# Patient Record
Sex: FEMALE | Race: WHITE | NOT HISPANIC OR LATINO | Employment: FULL TIME | URBAN - METROPOLITAN AREA
[De-identification: names, ages, dates, MRNs, and addresses within clinical notes are randomized per-mention and may not be internally consistent; named-entity substitution may affect disease eponyms.]

---

## 2023-12-12 ENCOUNTER — OFFICE VISIT (OUTPATIENT)
Dept: BARIATRICS | Facility: CLINIC | Age: 38
End: 2023-12-12
Payer: COMMERCIAL

## 2023-12-12 VITALS
RESPIRATION RATE: 14 BRPM | BODY MASS INDEX: 28.85 KG/M2 | WEIGHT: 213 LBS | HEIGHT: 72 IN | HEART RATE: 80 BPM | DIASTOLIC BLOOD PRESSURE: 70 MMHG | SYSTOLIC BLOOD PRESSURE: 124 MMHG

## 2023-12-12 DIAGNOSIS — E66.3 OVERWEIGHT: Primary | ICD-10-CM

## 2023-12-12 PROCEDURE — 99213 OFFICE O/P EST LOW 20 MIN: CPT | Performed by: NURSE PRACTITIONER

## 2023-12-12 RX ORDER — TOPIRAMATE 50 MG/1
50 TABLET, FILM COATED ORAL 2 TIMES DAILY
Qty: 180 TABLET | Refills: 1 | Status: SHIPPED | OUTPATIENT
Start: 2023-12-12

## 2023-12-12 NOTE — PROGRESS NOTES
Assessment/Plan:     Overweight  - Patient is pursuing Conservative Program  - Initial weight loss goal of 5-10% weight loss for improved health-met  - Patient denies personal history of pancreatitis. Patient also denies personal and family history of thyroid cancer and multiple endocrine neoplasia type 2 (MEN 2 tumor). Cornell Jacobo started Feb 2023 at weight of 237 lbs. Titrated up to 1.7 mg weekly, but then developed significant nausea and therefore medication stopped around the end of May 2023.  - Topamax started Aug 2023 to help with cravings at weight of 213 lbs. Increase Topamax from 25 mg twice a day to 50 mg twice a day. Tolerating well, intermittently helping with appetite. - Denies history of seizures, kidney stones, or glaucoma. - Potential side effects of Topamax (topiramate) include: numbness or tingling, fatigue, depression/anxiety, changes in taste, abdominal upset/heartburn, trouble sleeping, and may reduce sweating - stay well hydrated to avoid overheating. Continue to use birth control on Topamax, as it can cause birth defects. - In office urine pregnancy test was negative in Aug 2023. - Medication contract signed 8/8/2023.   - Contraception:  with vasectomy  - Screening labs done May 2023. Initial: 237 lbs BMI 33.05  Last visit: 213 lbs   Current: 213 lbs BMI 28.89  Change: - 24lbs (no change since last office visit  Goal: 200's lbs    Goals:  Continue food logging. 1400 calories per day. Keep up the great work with water intake, at least 64 oz daily. Keep up the great work with exercise. Try to consistently have midmorning snack to see if that helps with reducing hunger later in the day. Continue to use protein shakes and bars as needed. Increase Topamax         Peace Islas was seen today for follow-up. Diagnoses and all orders for this visit:    Overweight  -     topiramate (Topamax) 50 MG tablet;  Take 1 tablet (50 mg total) by mouth 2 (two) times a day            Follow up in approximately  2 month nurse visit and 4 months  with Non-Surgical Physician/Advanced Practitioner. Subjective:   Chief Complaint   Patient presents with    Follow-up     MWM- 4mth f/u- Waist 39in       Patient ID: Allie Degree  is a 45 y.o. female with excess weight/obesity here to pursue weight management. Patient is pursuing Conservative Program.   Most recent notes and records were reviewed. HPI    Wt Readings from Last 10 Encounters:   12/12/23 96.6 kg (213 lb)   10/10/23 94.6 kg (208 lb 9.6 oz)   08/08/23 96.6 kg (213 lb)   05/23/23 94 kg (207 lb 2 oz)   05/09/23 95.5 kg (210 lb 9.6 oz)   05/04/23 96.9 kg (213 lb 11.2 oz)   04/20/23 98.9 kg (218 lb)   04/10/23 100 kg (221 lb)   03/20/23 103 kg (227 lb 9.6 oz)   02/17/23 108 kg (237 lb)     Taking Topamax 25 mg twice a day. No negative side effects. Intermittently helping with appetite. Has been food logging. Getting 9665-8946 calories per day. 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   Mammogram: N/A      The following portions of the patient's history were reviewed and updated as appropriate: allergies, current medications, past family history, past medical history, past social history, past surgical history, and problem list.    Family History   Problem Relation Age of Onset    Hypertension Father     Hyperlipidemia Father         Pure hypercholesterolemia     Asthma Sister     Diabetes Maternal Aunt     Stroke Maternal Grandmother         Stroke syndrome     Other Family     Esophageal cancer Family     Heart disease Neg Hx     Thyroid disease Neg Hx         Review of Systems   HENT:  Negative for sore throat. Respiratory:  Negative for cough and shortness of breath. Cardiovascular:  Negative for chest pain and palpitations. Gastrointestinal:  Positive for constipation (intermittent) and diarrhea (intermittent). Negative for abdominal pain, nausea and vomiting. Denies GERD   Musculoskeletal:  Positive for back pain (chronic). Negative for arthralgias. Skin:  Negative for rash. Psychiatric/Behavioral:  Negative for suicidal ideas (or HI). Denies depression and anxiety       Objective:  /70 (BP Location: Left arm, Patient Position: Sitting)   Pulse 80   Resp 14   Ht 6' (1.829 m)   Wt 96.6 kg (213 lb)   BMI 28.89 kg/m²     Physical Exam  Vitals and nursing note reviewed. Constitutional   General appearance: Abnormal.  well developed and overweight. Eyes No conjunctival injection. Ears, Nose, Mouth, and Throat Oral mucosa moist.   Pulmonary   Respiratory effort: No increased work of breathing or signs of respiratory distress. Cardiovascular   Examination of extremities for edema and/or varicosities: Normal.  no edema. Abdomen   Abdomen: Abnormal overweight.      Musculoskeletal   Normal range of motion  Neurological   Gait and station: Normal.   Psychiatric   Orientation to person, place and time: Normal.    Affect: appropriate

## 2023-12-12 NOTE — ASSESSMENT & PLAN NOTE
- Patient is pursuing Conservative Program  - Initial weight loss goal of 5-10% weight loss for improved health-met  - Patient denies personal history of pancreatitis. Patient also denies personal and family history of thyroid cancer and multiple endocrine neoplasia type 2 (MEN 2 tumor). Marni Durham started Feb 2023 at weight of 237 lbs. Titrated up to 1.7 mg weekly, but then developed significant nausea and therefore medication stopped around the end of May 2023.  - Topamax started Aug 2023 to help with cravings at weight of 213 lbs. Increase Topamax from 25 mg twice a day to 50 mg twice a day. Tolerating well, intermittently helping with appetite. - Denies history of seizures, kidney stones, or glaucoma. - Potential side effects of Topamax (topiramate) include: numbness or tingling, fatigue, depression/anxiety, changes in taste, abdominal upset/heartburn, trouble sleeping, and may reduce sweating - stay well hydrated to avoid overheating. Continue to use birth control on Topamax, as it can cause birth defects. - In office urine pregnancy test was negative in Aug 2023. - Medication contract signed 8/8/2023.   - Contraception:  with vasectomy  - Screening labs done May 2023. Initial: 237 lbs BMI 33.05  Last visit: 213 lbs   Current: 213 lbs BMI 28.89  Change: - 24lbs (no change since last office visit  Goal: 200's lbs    Goals:  Continue food logging. 1400 calories per day. Keep up the great work with water intake, at least 64 oz daily. Keep up the great work with exercise. Try to consistently have midmorning snack to see if that helps with reducing hunger later in the day. Continue to use protein shakes and bars as needed.   Increase Topamax

## 2024-02-15 ENCOUNTER — CLINICAL SUPPORT (OUTPATIENT)
Dept: BARIATRICS | Facility: CLINIC | Age: 39
End: 2024-02-15

## 2024-02-15 VITALS
DIASTOLIC BLOOD PRESSURE: 75 MMHG | BODY MASS INDEX: 29.01 KG/M2 | TEMPERATURE: 98.2 F | WEIGHT: 214.2 LBS | HEART RATE: 76 BPM | RESPIRATION RATE: 16 BRPM | SYSTOLIC BLOOD PRESSURE: 115 MMHG | HEIGHT: 72 IN

## 2024-02-15 DIAGNOSIS — R63.5 ABNORMAL WEIGHT GAIN: Primary | ICD-10-CM

## 2024-02-15 PROCEDURE — RECHECK

## 2024-02-15 NOTE — PROGRESS NOTES
Patient last visit weight:  Patient current visit weight:    If you are taking phentermine or other oral weight loss medications, are you experiencing any of the following symptoms:  Headache:  No  Blurred Vision: No  Chest Pain: No  Palpitations: No  Insomnia: No  SPECIFY ORAL MEDICATION AND DOSAGE:  Topamax  She stated she went back down to 50mg again on the 75mg she had some eye twitching. Stop since she went back to 50mg    If you are taking an injectable medication,  are you experiencing any of the following symptoms:  Bloating:   Nausea:  Vomiting:   Constipation:   Diarrhea:  SPECIFY INJECTABLE MEDICATION AND CURRENT DOSAGE:      Vitals:    Is BP less than 100/60? No  Is BP greater than 140/90? No  Is HR greater than 100? No  **If yes to any of the above, have patient relax and repeat in 5-10 minutes**    Repeat values:    Is BP less than 100/60?  Is BP greater than 140/90?  Is HR greater than 100?  **If values remain outside of ranges above, please consult provider for next steps**

## 2024-03-02 DIAGNOSIS — F39 MOOD DISORDER (HCC): ICD-10-CM

## 2024-05-09 ENCOUNTER — OFFICE VISIT (OUTPATIENT)
Dept: FAMILY MEDICINE CLINIC | Facility: CLINIC | Age: 39
End: 2024-05-09
Payer: COMMERCIAL

## 2024-05-09 VITALS
HEIGHT: 72 IN | DIASTOLIC BLOOD PRESSURE: 70 MMHG | HEART RATE: 76 BPM | BODY MASS INDEX: 28.88 KG/M2 | SYSTOLIC BLOOD PRESSURE: 120 MMHG | TEMPERATURE: 96.3 F | RESPIRATION RATE: 18 BRPM | WEIGHT: 213.2 LBS

## 2024-05-09 DIAGNOSIS — F39 MOOD DISORDER (HCC): ICD-10-CM

## 2024-05-09 DIAGNOSIS — Z00.00 ROUTINE PHYSICAL EXAMINATION: Primary | ICD-10-CM

## 2024-05-09 DIAGNOSIS — E66.3 OVERWEIGHT: ICD-10-CM

## 2024-05-09 PROCEDURE — 99395 PREV VISIT EST AGE 18-39: CPT | Performed by: NURSE PRACTITIONER

## 2024-05-09 NOTE — PROGRESS NOTES
ADULT ANNUAL PHYSICAL  [facility]    NAME: Cristel Daly  AGE: 38 y.o. SEX: female  : 1985     DATE: 2024     Assessment and Plan:     Problem List Items Addressed This Visit          Behavioral Health    Mood disorder (HCC) of depressive type in remission     Stable, continue Zoloft 50 mg daily             Other    Overweight     Pt is following with Weight Management          Relevant Orders    CBC and differential    Comprehensive metabolic panel    Hemoglobin A1C    TSH, 3rd generation with Free T4 reflex    Lipid panel     Other Visit Diagnoses       Routine physical examination    -  Primary            Immunizations and preventive care screenings were discussed with patient today. Appropriate education was printed on patient's after visit summary.    Counseling:  Alcohol/drug use: discussed moderation in alcohol intake, the recommendations for healthy alcohol use, and avoidance of illicit drug use.  Dental Health: discussed importance of regular tooth brushing, flossing, and dental visits.  Injury prevention: discussed safety/seat belts, safety helmets, smoke detectors, carbon dioxide detectors, and smoking near bedding or upholstery.  Sexual health: discussed sexually transmitted diseases, partner selection, use of condoms, avoidance of unintended pregnancy, and contraceptive alternatives.  Exercise: the importance of regular exercise/physical activity was discussed. Recommend exercise 3-5 times per week for at least 30 minutes.       Depression Screening and Follow-up Plan: Patient was screened for depression during today's encounter. They screened negative with a PHQ-2 score of 0.        No follow-ups on file.     Chief Complaint:     Chief Complaint   Patient presents with    Physical Exam      History of Present Illness:     Adult Annual Physical   Patient here for a comprehensive physical exam. The patient reports no problems.   Doing well overall     Follows with Weight Management, currently on Topamax but hasn't seem much improvement with this and is considering stopping it    Colonoscopy completed 3/31/22, repeat in 7 years per GI note on 3/20/23, Dr. Finn  Follows with GI for constipation, doing well on Linzess     Diet and Physical Activity  Diet/Nutrition: well balanced diet.   Exercise: walking. Enjoys walking her dog regularly     Depression Screening  PHQ-2/9 Depression Screening    Little interest or pleasure in doing things: 0 - not at all  Feeling down, depressed, or hopeless: 0 - not at all  PHQ-2 Score: 0  PHQ-2 Interpretation: Negative depression screen       General Health  Sleep: sleeps well.   Hearing: normal - bilateral.  Vision: no vision problems.   Dental: regular dental visits.       /GYN Health  Follows with gynecology? yes   Last menstrual period: 4/25/24  Contraceptive method: male partner had vasectomy.  History of STDs?: no.     Advanced Care Planning  Do you have an advanced directive? no  Do you have a durable medical power of ? no  ACP document given to the patient? No, pt declined       Review of Systems:     Review of Systems   Constitutional:  Negative for activity change, appetite change, chills, diaphoresis, fatigue, fever and unexpected weight change.   Eyes:  Negative for visual disturbance.   Respiratory:  Negative for cough, chest tightness, shortness of breath and wheezing.    Cardiovascular:  Negative for chest pain, palpitations and leg swelling.   Gastrointestinal:  Negative for abdominal pain, blood in stool, constipation, diarrhea and nausea.   Genitourinary:  Negative for dysuria.   Musculoskeletal:  Negative for arthralgias and myalgias.   Skin:  Negative for rash and wound.   Neurological:  Negative for dizziness, weakness, numbness and headaches.   Hematological:  Negative for adenopathy. Does not bruise/bleed easily.   Psychiatric/Behavioral:  Negative for dysphoric mood,  self-injury, sleep disturbance and suicidal ideas. The patient is not nervous/anxious.       Past Medical History:     Past Medical History:   Diagnosis Date    Chronic left lumbar radiculopathy     Resolved 7/14/2016     Chronic pain disorder     Depression     Depression     Hypertension 07/09/2012    Pneumonia     Varicella       Past Surgical History:     Past Surgical History:   Procedure Laterality Date    COLONOSCOPY      RHIZOTOMY      Last assessed 4/26/2017       Social History:     Social History     Socioeconomic History    Marital status: /Civil Union     Spouse name: None    Number of children: None    Years of education: None    Highest education level: None   Occupational History    Occupation: MA   Tobacco Use    Smoking status: Never    Smokeless tobacco: Never   Vaping Use    Vaping status: Never Used   Substance and Sexual Activity    Alcohol use: Yes     Comment: occaisionally    Drug use: No    Sexual activity: Yes     Partners: Male     Birth control/protection: Male Sterilization   Other Topics Concern    None   Social History Narrative    None     Social Determinants of Health     Financial Resource Strain: Not on file   Food Insecurity: Not on file   Transportation Needs: Not on file   Physical Activity: Not on file   Stress: Not on file   Social Connections: Not on file   Intimate Partner Violence: Not on file   Housing Stability: Not on file      Family History:     Family History   Problem Relation Age of Onset    Hypertension Father     Hyperlipidemia Father         Pure hypercholesterolemia     Asthma Sister     Diabetes Maternal Aunt     Stroke Maternal Grandmother         Stroke syndrome     Other Family     Esophageal cancer Family     Heart disease Neg Hx     Thyroid disease Neg Hx       Current Medications:     Current Outpatient Medications   Medication Sig Dispense Refill    acetaminophen (TYLENOL) 500 mg tablet Take 1,000 mg by mouth as needed for mild pain (TID if  "needed)      BIOTIN PO Take by mouth      ibuprofen (MOTRIN) 800 mg tablet Take 800 mg by mouth as needed for mild pain      linaCLOtide (Linzess) 290 MCG CAPS Take 1 capsule by mouth daily before breakfast 90 capsule 2    Loratadine 10 MG CAPS       Multiple Vitamin (multivitamin) tablet Take 1 tablet by mouth daily With iron      sertraline (ZOLOFT) 50 mg tablet TAKE ONE TABLET BY MOUTH EVERY DAY 90 tablet 0    topiramate (Topamax) 50 MG tablet Take 1 tablet (50 mg total) by mouth 2 (two) times a day 180 tablet 1     No current facility-administered medications for this visit.      Allergies:     No Known Allergies   Physical Exam:     /70 (BP Location: Left arm, Patient Position: Sitting, Cuff Size: Standard)   Pulse 76   Temp (!) 96.3 °F (35.7 °C) (Temporal)   Resp 18   Ht 6' 1\" (1.854 m)   Wt 96.7 kg (213 lb 3.2 oz)   LMP  (LMP Unknown)   BMI 28.13 kg/m²     Physical Exam  Constitutional:       General: She is not in acute distress.     Appearance: Normal appearance. She is not ill-appearing, toxic-appearing or diaphoretic.   HENT:      Head: Normocephalic and atraumatic.      Mouth/Throat:      Mouth: Mucous membranes are moist.   Eyes:      Extraocular Movements: Extraocular movements intact.      Conjunctiva/sclera: Conjunctivae normal.      Pupils: Pupils are equal, round, and reactive to light.   Cardiovascular:      Rate and Rhythm: Normal rate and regular rhythm.      Pulses: Normal pulses.      Heart sounds: Normal heart sounds. No murmur heard.  Pulmonary:      Effort: Pulmonary effort is normal. No respiratory distress.      Breath sounds: Normal breath sounds. No wheezing.   Abdominal:      General: Bowel sounds are normal. There is no distension.      Palpations: Abdomen is soft.      Tenderness: There is no abdominal tenderness.   Musculoskeletal:         General: Normal range of motion.      Cervical back: Normal range of motion and neck supple. No rigidity or tenderness. No muscular " tenderness.   Lymphadenopathy:      Cervical: No cervical adenopathy.   Skin:     General: Skin is warm and dry.      Capillary Refill: Capillary refill takes less than 2 seconds.      Findings: No bruising, erythema or rash.   Neurological:      General: No focal deficit present.      Mental Status: She is alert and oriented to person, place, and time.   Psychiatric:         Mood and Affect: Mood normal.         Behavior: Behavior normal.         Thought Content: Thought content normal.         Judgment: Judgment normal.          RENEE Flores   [facility]

## 2024-06-11 ENCOUNTER — OFFICE VISIT (OUTPATIENT)
Dept: BARIATRICS | Facility: CLINIC | Age: 39
End: 2024-06-11
Payer: COMMERCIAL

## 2024-06-11 VITALS
BODY MASS INDEX: 28.96 KG/M2 | HEIGHT: 72 IN | WEIGHT: 213.8 LBS | HEART RATE: 58 BPM | TEMPERATURE: 97.8 F | SYSTOLIC BLOOD PRESSURE: 142 MMHG | RESPIRATION RATE: 16 BRPM | DIASTOLIC BLOOD PRESSURE: 70 MMHG

## 2024-06-11 DIAGNOSIS — E66.3 OVERWEIGHT: Primary | ICD-10-CM

## 2024-06-11 DIAGNOSIS — R63.8 ABNORMAL CRAVING: ICD-10-CM

## 2024-06-11 PROBLEM — R11.0 NAUSEA: Status: RESOLVED | Noted: 2023-05-23 | Resolved: 2024-06-11

## 2024-06-11 PROCEDURE — 99214 OFFICE O/P EST MOD 30 MIN: CPT | Performed by: NURSE PRACTITIONER

## 2024-06-11 RX ORDER — BUPROPION HYDROCHLORIDE 150 MG/1
150 TABLET ORAL DAILY
Qty: 30 TABLET | Refills: 3 | Status: SHIPPED | OUTPATIENT
Start: 2024-06-11

## 2024-06-11 RX ORDER — NALTREXONE HYDROCHLORIDE 50 MG/1
TABLET, FILM COATED ORAL
Qty: 30 TABLET | Refills: 3 | Status: SHIPPED | OUTPATIENT
Start: 2024-06-11

## 2024-06-11 NOTE — PROGRESS NOTES
Assessment/Plan:     Overweight  - Patient is pursuing Conservative Program  - Initial weight loss goal of 5-10% weight loss for improved health-met  - Patient denies personal history of pancreatitis. Patient also denies personal and family history of thyroid cancer and multiple endocrine neoplasia type 2 (MEN 2 tumor).  - Wegovy started Feb 2023 at weight of 237 lbs. Titrated up to 1.7 mg weekly, but then developed significant nausea and therefore medication stopped around the end of May 2023.  - Topamax started Aug 2023 to help with cravings at weight of 213 lbs. In office urine pregnancy test negative. Topamax titrated up to 50 mg twice a day, but had eye twitching and therefore reduced to 50 mg every evening. Currently taking 50 mg every evening, tolerating well, but not helping with appetite/cravings. Will wean off of Topamax - 25 mg every evening for one week, then 25 mg every other evening for one week, then stop.     - Denies history of seizures, kidney stones, or glaucoma.   - Interested in starting another medication to help with appetite/cravings.   - She made an informed decision to start Wellbutrin and Nalrexone off label to mimic Contrave.  - /70, not typically elevated, she relates this to caffeine and meeting this morning. BP one month ago at primary care visit was 120/70. Will check repeat BP and HR in office one week after starting Wellbutrin to ensure stability. She will call to schedule that.   - Start Wellbutrin  mg daily in the morning and in 1 week, as long as you are tolerating Wellbutrin well, start Naltrexone.   - Start Naltrexone 50 mg tablet, take 1/2 tablet by mouth daily in the morning for 2 weeks, then take 1/2 tablet twice a day.   - Potential side effects of Wellbutrin include: abdominal upset, headache, dizziness, trouble sleeping, increased blood pressure, depression/anxiety, and fatigue. Patient should call/return if he/she develops symptoms of depression/aniety.  Patient should have ER evaluation if thoughts of harming self/others occur. Wellbutrin should be weaned rather than stopped abruptly.    - Side effects of naltrexone: nausea, vomiting, diarrhea, constipation, headache, anxiety, and confusion. You may feel nauseated when consuming alcohol while taking naltrexone. Must be discontinued prior to surgery. Please notify me if you are scheduled for surgery, so that naltrexone can be stopped prior to surgery.      - Medication contract signed 8/8/2023.   - Contraception:  with vasectomy  - Getting routine screening labs through primary care.        Initial: 237 lbs BMI 33.05  Last visit: 213 lbs BMI 28.89  Current: 213.8 lbs BMI 29.40  Change: - 23 lbs (+1 since last office visit  Goal: 200's lbs    Goals:  Continue food logging.    1400 calories per day.  Keep up the great work with water intake, at least 64 oz daily.  Keep up the great work with exercise.   Continue to use protein shakes and bars as needed.  Wean off Topamax  Start Wellbutrin and Naltrexone.  Check BP one week after starting Wellbutrin.           Molly was seen today for follow-up.    Diagnoses and all orders for this visit:    Overweight  -     buPROPion (Wellbutrin XL) 150 mg 24 hr tablet; Take 1 tablet (150 mg total) by mouth daily  -     naltrexone (REVIA) 50 mg tablet; Take 1/2 tablet by mouth daily in the morning for 2 weeks, then take 1/2 tablet twice a day.    Abnormal craving  -     buPROPion (Wellbutrin XL) 150 mg 24 hr tablet; Take 1 tablet (150 mg total) by mouth daily  -     naltrexone (REVIA) 50 mg tablet; Take 1/2 tablet by mouth daily in the morning for 2 weeks, then take 1/2 tablet twice a day.              Follow up in approximately  1 week nurse visit, 3 month nurse visit and 6 months  with Non-Surgical Physician/Advanced Practitioner.    Subjective:   Chief Complaint   Patient presents with    Follow-up     MWM- 4mo f/u; Waist 38.5in       Patient ID: Cristel Daly  is a 38  y.o. female with excess weight/obesity here to pursue weight management.  Patient is pursuing Conservative Program.   Most recent notes and records were reviewed.    HPI    Wt Readings from Last 10 Encounters:   06/11/24 97 kg (213 lb 12.8 oz)   05/09/24 96.7 kg (213 lb 3.2 oz)   02/15/24 97.2 kg (214 lb 3.2 oz)   12/12/23 96.6 kg (213 lb)   10/10/23 94.6 kg (208 lb 9.6 oz)   08/08/23 96.6 kg (213 lb)   05/23/23 94 kg (207 lb 2 oz)   05/09/23 95.5 kg (210 lb 9.6 oz)   05/04/23 96.9 kg (213 lb 11.2 oz)   04/20/23 98.9 kg (218 lb)     Taking Topamax 50 mg every evening, no negative side effects from current dose. Had eye twitching on higher doses. Not helpful with appetite.       Has been food logging. Getting 2768-4899 calories per day. Struggling with cravings in the evening.        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  Mammogram: N/A      The following portions of the patient's history were reviewed and updated as appropriate: allergies, current medications, past family history, past medical history, past social history, past surgical history, and problem list.    Family History   Problem Relation Age of Onset    Hypertension Father     Hyperlipidemia Father         Pure hypercholesterolemia     Asthma Sister     Diabetes Maternal Aunt     Stroke Maternal Grandmother         Stroke syndrome     Other Family     Esophageal cancer Family     Heart disease Neg Hx     Thyroid disease Neg Hx         Review of Systems   HENT:  Negative for sore throat.    Respiratory:  Negative for cough and shortness of breath.    Cardiovascular:  Negative for chest pain and palpitations.   Gastrointestinal:   "Positive for constipation (intermittent) and diarrhea (intermittent). Negative for abdominal pain, nausea and vomiting.        Denies GERD   Musculoskeletal:  Positive for back pain (chronic). Negative for arthralgias.   Skin:  Negative for rash.   Psychiatric/Behavioral:  Negative for suicidal ideas (or HI).         Denies depression and anxiety       Objective:  /70   Pulse 58   Temp 97.8 °F (36.6 °C)   Resp 16   Ht 5' 11.5\" (1.816 m)   Wt 97 kg (213 lb 12.8 oz)   BMI 29.40 kg/m²     Physical Exam  Vitals and nursing note reviewed.        Constitutional   General appearance: Abnormal.  well developed and overweight.   Eyes No conjunctival injection.   Ears, Nose, Mouth, and Throat Oral mucosa moist.   Pulmonary   Respiratory effort: No increased work of breathing or signs of respiratory distress.    Cardiovascular   Examination of extremities for edema and/or varicosities: Normal.  no edema.   Abdomen   Abdomen: Abnormal overweight.     Musculoskeletal   Normal range of motion  Neurological   Gait and station: Normal.   Psychiatric   Orientation to person, place and time: Normal.    Affect: appropriate        "

## 2024-06-11 NOTE — ASSESSMENT & PLAN NOTE
- Patient is pursuing Conservative Program  - Initial weight loss goal of 5-10% weight loss for improved health-met  - Patient denies personal history of pancreatitis. Patient also denies personal and family history of thyroid cancer and multiple endocrine neoplasia type 2 (MEN 2 tumor).  - Wegovy started Feb 2023 at weight of 237 lbs. Titrated up to 1.7 mg weekly, but then developed significant nausea and therefore medication stopped around the end of May 2023.  - Topamax started Aug 2023 to help with cravings at weight of 213 lbs. In office urine pregnancy test negative. Topamax titrated up to 50 mg twice a day, but had eye twitching and therefore reduced to 50 mg every evening. Currently taking 50 mg every evening, tolerating well, but not helping with appetite/cravings. Will wean off of Topamax - 25 mg every evening for one week, then 25 mg every other evening for one week, then stop.     - Denies history of seizures, kidney stones, or glaucoma.   - Interested in starting another medication to help with appetite/cravings.   - She made an informed decision to start Wellbutrin and Nalrexone off label to mimic Contrave.  - /70, not typically elevated, she relates this to caffeine and meeting this morning. BP one month ago at primary care visit was 120/70. Will check repeat BP and HR in office one week after starting Wellbutrin to ensure stability. She will call to schedule that.   - Start Wellbutrin  mg daily in the morning and in 1 week, as long as you are tolerating Wellbutrin well, start Naltrexone.   - Start Naltrexone 50 mg tablet, take 1/2 tablet by mouth daily in the morning for 2 weeks, then take 1/2 tablet twice a day.   - Potential side effects of Wellbutrin include: abdominal upset, headache, dizziness, trouble sleeping, increased blood pressure, depression/anxiety, and fatigue. Patient should call/return if he/she develops symptoms of depression/aniety. Patient should have ER evaluation if  thoughts of harming self/others occur. Wellbutrin should be weaned rather than stopped abruptly.    - Side effects of naltrexone: nausea, vomiting, diarrhea, constipation, headache, anxiety, and confusion. You may feel nauseated when consuming alcohol while taking naltrexone. Must be discontinued prior to surgery. Please notify me if you are scheduled for surgery, so that naltrexone can be stopped prior to surgery.      - Medication contract signed 8/8/2023.   - Contraception:  with vasectomy  - Getting routine screening labs through primary care.        Initial: 237 lbs BMI 33.05  Last visit: 213 lbs BMI 28.89  Current: 213.8 lbs BMI 29.40  Change: - 23 lbs (+1 since last office visit  Goal: 200's lbs    Goals:  Continue food logging.    1400 calories per day.  Keep up the great work with water intake, at least 64 oz daily.  Keep up the great work with exercise.   Continue to use protein shakes and bars as needed.  Wean off Topamax  Start Wellbutrin and Naltrexone.  Check BP one week after starting Wellbutrin.

## 2024-06-11 NOTE — PATIENT INSTRUCTIONS
Take Topamax 1/2 tab daily in the evening for one week, then 1/2 tab every other evening for one week, then stop.     Start Wellbutrin  mg daily in the morning and in 1 week, as long as you are tolerating Wellbutrin well, start Naltrexone.     Start Naltrexone 50 mg tablet, take 1/2 tablet by mouth daily in the morning for 2 weeks, then take 1/2 tablet twice a day.     Potential side effects of Wellbutrin include: abdominal upset, headache, dizziness, trouble sleeping, increased blood pressure, depression/anxiety, and fatigue. Patient should call/return if he/she develops symptoms of depression/aniety. Patient should have ER evaluation if thoughts of harming self/others occur. Wellbutrin should be weaned rather than stopped abruptly.      Side effects of naltrexone: nausea, vomiting, diarrhea, constipation, headache, anxiety, and confusion. You may feel nauseated when consuming alcohol while taking naltrexone. Must be discontinued prior to surgery. Please notify me if you are scheduled for surgery, so that naltrexone can be stopped prior to surgery.

## 2024-06-16 DIAGNOSIS — F39 MOOD DISORDER (HCC): ICD-10-CM

## 2024-07-19 ENCOUNTER — CLINICAL SUPPORT (OUTPATIENT)
Dept: BARIATRICS | Facility: CLINIC | Age: 39
End: 2024-07-19

## 2024-07-19 VITALS
SYSTOLIC BLOOD PRESSURE: 124 MMHG | HEIGHT: 72 IN | WEIGHT: 216 LBS | TEMPERATURE: 97.3 F | DIASTOLIC BLOOD PRESSURE: 78 MMHG | RESPIRATION RATE: 16 BRPM | BODY MASS INDEX: 29.26 KG/M2 | HEART RATE: 62 BPM

## 2024-07-19 DIAGNOSIS — R63.5 ABNORMAL WEIGHT GAIN: Primary | ICD-10-CM

## 2024-07-19 PROCEDURE — RECHECK

## 2024-07-19 NOTE — PROGRESS NOTES
Patient last visit weight: 213lb  Patient current visit weight: 216lb     If you are taking phentermine or other oral weight loss medications, are you experiencing any of the following symptoms:  Headache: NO   Blurred Vision: NO   Chest Pain: NO   Palpitations: NO   Insomnia: NO   SPECIFY ORAL MEDICATION AND DOSAGE: Wellbutrin XL and Naltrexone. Patient tolerating well.     If you are taking an injectable medication,  are you experiencing any of the following symptoms:  Bloating:   Nausea:  Vomiting:   Constipation:   Diarrhea:  SPECIFY INJECTABLE MEDICATION AND CURRENT DOSAGE:       Vitals:    Is BP less than 100/60? NO   Is BP greater than 140/90? NO   Is HR greater than 100? NO   **If yes to any of the above, have patient relax and repeat in 5-10 minutes**    Repeat values:    Is BP less than 100/60?  Is BP greater than 140/90?  Is HR greater than 100?  **If values remain outside of ranges above, please consult provider for next steps**

## 2024-07-27 ENCOUNTER — APPOINTMENT (OUTPATIENT)
Dept: LAB | Facility: IMAGING CENTER | Age: 39
End: 2024-07-27
Payer: COMMERCIAL

## 2024-07-27 DIAGNOSIS — Z00.8 HEALTH EXAMINATION IN POPULATION SURVEY: ICD-10-CM

## 2024-07-27 DIAGNOSIS — E66.3 OVERWEIGHT: ICD-10-CM

## 2024-07-27 LAB
ALBUMIN SERPL BCG-MCNC: 4.1 G/DL (ref 3.5–5)
ALP SERPL-CCNC: 35 U/L (ref 34–104)
ALT SERPL W P-5'-P-CCNC: 12 U/L (ref 7–52)
ANION GAP SERPL CALCULATED.3IONS-SCNC: 8 MMOL/L (ref 4–13)
AST SERPL W P-5'-P-CCNC: 15 U/L (ref 13–39)
BASOPHILS # BLD AUTO: 0.06 THOUSANDS/ÂΜL (ref 0–0.1)
BASOPHILS NFR BLD AUTO: 1 % (ref 0–1)
BILIRUB SERPL-MCNC: 0.45 MG/DL (ref 0.2–1)
BUN SERPL-MCNC: 14 MG/DL (ref 5–25)
CALCIUM SERPL-MCNC: 9.2 MG/DL (ref 8.4–10.2)
CHLORIDE SERPL-SCNC: 106 MMOL/L (ref 96–108)
CHOLEST SERPL-MCNC: 164 MG/DL
CO2 SERPL-SCNC: 24 MMOL/L (ref 21–32)
CREAT SERPL-MCNC: 0.95 MG/DL (ref 0.6–1.3)
EOSINOPHIL # BLD AUTO: 0.16 THOUSAND/ÂΜL (ref 0–0.61)
EOSINOPHIL NFR BLD AUTO: 3 % (ref 0–6)
ERYTHROCYTE [DISTWIDTH] IN BLOOD BY AUTOMATED COUNT: 13.2 % (ref 11.6–15.1)
EST. AVERAGE GLUCOSE BLD GHB EST-MCNC: 100 MG/DL
GFR SERPL CREATININE-BSD FRML MDRD: 76 ML/MIN/1.73SQ M
GLUCOSE P FAST SERPL-MCNC: 87 MG/DL (ref 65–99)
HBA1C MFR BLD: 5.1 %
HCT VFR BLD AUTO: 42 % (ref 34.8–46.1)
HDLC SERPL-MCNC: 67 MG/DL
HGB BLD-MCNC: 13.8 G/DL (ref 11.5–15.4)
IMM GRANULOCYTES # BLD AUTO: 0.01 THOUSAND/UL (ref 0–0.2)
IMM GRANULOCYTES NFR BLD AUTO: 0 % (ref 0–2)
LDLC SERPL CALC-MCNC: 81 MG/DL (ref 0–100)
LYMPHOCYTES # BLD AUTO: 1.98 THOUSANDS/ÂΜL (ref 0.6–4.47)
LYMPHOCYTES NFR BLD AUTO: 34 % (ref 14–44)
MCH RBC QN AUTO: 31.7 PG (ref 26.8–34.3)
MCHC RBC AUTO-ENTMCNC: 32.9 G/DL (ref 31.4–37.4)
MCV RBC AUTO: 97 FL (ref 82–98)
MONOCYTES # BLD AUTO: 0.44 THOUSAND/ÂΜL (ref 0.17–1.22)
MONOCYTES NFR BLD AUTO: 8 % (ref 4–12)
NEUTROPHILS # BLD AUTO: 3.13 THOUSANDS/ÂΜL (ref 1.85–7.62)
NEUTS SEG NFR BLD AUTO: 54 % (ref 43–75)
NONHDLC SERPL-MCNC: 97 MG/DL
NRBC BLD AUTO-RTO: 0 /100 WBCS
PLATELET # BLD AUTO: 205 THOUSANDS/UL (ref 149–390)
PMV BLD AUTO: 12.1 FL (ref 8.9–12.7)
POTASSIUM SERPL-SCNC: 4.2 MMOL/L (ref 3.5–5.3)
PROT SERPL-MCNC: 7 G/DL (ref 6.4–8.4)
RBC # BLD AUTO: 4.35 MILLION/UL (ref 3.81–5.12)
SODIUM SERPL-SCNC: 138 MMOL/L (ref 135–147)
TRIGL SERPL-MCNC: 80 MG/DL
TSH SERPL DL<=0.05 MIU/L-ACNC: 2.02 UIU/ML (ref 0.45–4.5)
WBC # BLD AUTO: 5.78 THOUSAND/UL (ref 4.31–10.16)

## 2024-07-27 PROCEDURE — 36415 COLL VENOUS BLD VENIPUNCTURE: CPT

## 2024-07-27 PROCEDURE — 80061 LIPID PANEL: CPT

## 2024-07-27 PROCEDURE — 80053 COMPREHEN METABOLIC PANEL: CPT

## 2024-07-27 PROCEDURE — 85025 COMPLETE CBC W/AUTO DIFF WBC: CPT

## 2024-07-27 PROCEDURE — 83036 HEMOGLOBIN GLYCOSYLATED A1C: CPT

## 2024-07-27 PROCEDURE — 84443 ASSAY THYROID STIM HORMONE: CPT

## 2024-07-29 DIAGNOSIS — Z00.6 ENCOUNTER FOR EXAMINATION FOR NORMAL COMPARISON OR CONTROL IN CLINICAL RESEARCH PROGRAM: ICD-10-CM

## 2024-08-13 ENCOUNTER — ANNUAL EXAM (OUTPATIENT)
Dept: OBGYN CLINIC | Facility: MEDICAL CENTER | Age: 39
End: 2024-08-13
Payer: COMMERCIAL

## 2024-08-13 VITALS
DIASTOLIC BLOOD PRESSURE: 60 MMHG | SYSTOLIC BLOOD PRESSURE: 118 MMHG | HEIGHT: 71 IN | BODY MASS INDEX: 29.96 KG/M2 | WEIGHT: 214 LBS

## 2024-08-13 DIAGNOSIS — Z12.4 SCREENING FOR CERVICAL CANCER: ICD-10-CM

## 2024-08-13 DIAGNOSIS — Z01.419 WOMEN'S ANNUAL ROUTINE GYNECOLOGICAL EXAMINATION: Primary | ICD-10-CM

## 2024-08-13 PROCEDURE — G0145 SCR C/V CYTO,THINLAYER,RESCR: HCPCS | Performed by: OBSTETRICS & GYNECOLOGY

## 2024-08-13 PROCEDURE — S0612 ANNUAL GYNECOLOGICAL EXAMINA: HCPCS | Performed by: OBSTETRICS & GYNECOLOGY

## 2024-08-13 PROCEDURE — G0476 HPV COMBO ASSAY CA SCREEN: HCPCS | Performed by: OBSTETRICS & GYNECOLOGY

## 2024-08-13 NOTE — PROGRESS NOTES
A/P    1.  Annual exam    Last PAP  4/19/22- neg neg    2021- HPV positive    We did discuss that negative last year can push to 3-5 years but with the positive in 2021- consider now and if negative again then spacing out    Next due  do today    Scheduling of pap discussed in detail            38 y.o.,No LMP recorded.  C/O no gyn concerns doing well       Past medical / social / surgical / family history reviewed and updated   Medication and allergies discussed in detail and updated     Review of Systems - History obtained from chart review and the patient  General ROS: negative  Psychological ROS: negative  Ophthalmic ROS: negative  ENT ROS: negative  Allergy and Immunology ROS: negative  Hematological and Lymphatic ROS: negative  Endocrine ROS: negative  Breast ROS: negative for breast lumps  Respiratory ROS: no cough, shortness of breath, or wheezing  Cardiovascular ROS: no chest pain or dyspnea on exertion  Gastrointestinal ROS: no abdominal pain, change in bowel habits, or black or bloody stools  Genito-Urinary ROS: no dysuria, trouble voiding, or hematuria  Musculoskeletal ROS: negative  Neurological ROS: no TIA or stroke symptoms  Dermatological ROS: negative        Physical Exam  Vitals reviewed. Exam conducted with a chaperone present.   Constitutional:       Appearance: She is well-developed.   Neck:      Thyroid: No thyromegaly.   Cardiovascular:      Rate and Rhythm: Normal rate.      Heart sounds: Normal heart sounds.   Pulmonary:      Effort: Pulmonary effort is normal. No accessory muscle usage or respiratory distress.      Breath sounds: Normal air entry.   Chest:      Chest wall: No tenderness.   Breasts:     Breasts are symmetrical.      Right: No inverted nipple, mass or tenderness.      Left: No inverted nipple, mass or tenderness.   Abdominal:      General: There is no distension.      Palpations: Abdomen is soft.      Tenderness: There is no abdominal tenderness. There is no right CVA  tenderness, left CVA tenderness, guarding or rebound.   Genitourinary:     General: Normal vulva.      Exam position: Lithotomy position.      Labia:         Right: No rash, tenderness, lesion or injury.         Left: No rash, tenderness, lesion or injury.       Vagina: Normal. No foreign body. No vaginal discharge or bleeding.      Cervix: Normal.      Uterus: Normal. Not enlarged and not fixed.       Adnexa: Right adnexa normal and left adnexa normal.        Right: No mass, tenderness or fullness.          Left: No mass, tenderness or fullness.        Rectum: No external hemorrhoid.   Musculoskeletal:      Cervical back: Normal range of motion.   Lymphadenopathy:      Cervical: No cervical adenopathy.      Upper Body:      Right upper body: No supraclavicular adenopathy.      Left upper body: No supraclavicular adenopathy.   Neurological:      Mental Status: She is alert and oriented to person, place, and time.   Psychiatric:         Speech: Speech normal.         Behavior: Behavior normal.         Thought Content: Thought content normal.         Judgment: Judgment normal.

## 2024-08-15 LAB
HPV HR 12 DNA CVX QL NAA+PROBE: NEGATIVE
HPV16 DNA CVX QL NAA+PROBE: NEGATIVE
HPV18 DNA CVX QL NAA+PROBE: NEGATIVE

## 2024-08-16 LAB
LAB AP GYN PRIMARY INTERPRETATION: NORMAL
Lab: NORMAL
PATH INTERP SPEC-IMP: NORMAL

## 2024-08-31 ENCOUNTER — APPOINTMENT (OUTPATIENT)
Dept: LAB | Facility: IMAGING CENTER | Age: 39
End: 2024-08-31

## 2024-08-31 DIAGNOSIS — Z00.6 ENCOUNTER FOR EXAMINATION FOR NORMAL COMPARISON OR CONTROL IN CLINICAL RESEARCH PROGRAM: ICD-10-CM

## 2024-08-31 PROCEDURE — 36415 COLL VENOUS BLD VENIPUNCTURE: CPT

## 2024-09-07 DIAGNOSIS — F39 MOOD DISORDER (HCC): ICD-10-CM

## 2024-09-14 LAB
APOB+LDLR+PCSK9 GENE MUT ANL BLD/T: NOT DETECTED
BRCA1+BRCA2 DEL+DUP + FULL MUT ANL BLD/T: NOT DETECTED
MLH1+MSH2+MSH6+PMS2 GN DEL+DUP+FUL M: NOT DETECTED

## 2024-10-07 ENCOUNTER — TELEPHONE (OUTPATIENT)
Dept: BARIATRICS | Facility: CLINIC | Age: 39
End: 2024-10-07

## 2024-10-07 DIAGNOSIS — E66.3 OVERWEIGHT: ICD-10-CM

## 2024-10-07 DIAGNOSIS — R63.8 ABNORMAL CRAVING: ICD-10-CM

## 2024-10-07 RX ORDER — BUPROPION HYDROCHLORIDE 150 MG/1
150 TABLET ORAL DAILY
Qty: 90 TABLET | Refills: 1 | Status: SHIPPED | OUTPATIENT
Start: 2024-10-07

## 2024-10-16 ENCOUNTER — CLINICAL SUPPORT (OUTPATIENT)
Dept: BARIATRICS | Facility: CLINIC | Age: 39
End: 2024-10-16

## 2024-10-16 VITALS
DIASTOLIC BLOOD PRESSURE: 78 MMHG | TEMPERATURE: 96.9 F | HEART RATE: 70 BPM | SYSTOLIC BLOOD PRESSURE: 119 MMHG | WEIGHT: 214 LBS | RESPIRATION RATE: 17 BRPM | BODY MASS INDEX: 29.96 KG/M2 | HEIGHT: 71 IN

## 2024-10-16 DIAGNOSIS — R63.5 ABNORMAL WEIGHT GAIN: Primary | ICD-10-CM

## 2024-10-16 PROCEDURE — RECHECK

## 2024-10-16 NOTE — PROGRESS NOTES
Patient last visit weight:216lb  Patient current visit weight:214lb    If you are taking phentermine or other oral weight loss medications, are you experiencing any of the following symptoms:  Headache: NO  Blurred Vision:  NO  Chest Pain:  NO  Palpitations: NO  Insomnia:  No  SPECIFY ORAL MEDICATION AND DOSAGE: Naltrexone 50 mg/Wellbutrin 150 mg   Pt was concerned about hair loss, I did tell the Pt that with losing wt you might lose hair as well , just to up the protein intake and vitamins as well.    If you are taking an injectable medication,  are you experiencing any of the following symptoms:  Bloating:   Nausea:  Vomiting:   Constipation:   Diarrhea:  SPECIFY INJECTABLE MEDICATION AND CURRENT DOSAGE:      Vitals:    Is BP less than 100/60? NO  Is BP greater than 140/90? NO  Is HR greater than 100? NO  **If yes to any of the above, have patient relax and repeat in 5-10 minutes**    Repeat values:    Is BP less than 100/60?  Is BP greater than 140/90?  Is HR greater than 100?  **If values remain outside of ranges above, please consult provider for next steps**

## 2024-12-26 ENCOUNTER — TELEPHONE (OUTPATIENT)
Dept: BARIATRICS | Facility: CLINIC | Age: 39
End: 2024-12-26

## 2025-02-03 ENCOUNTER — OFFICE VISIT (OUTPATIENT)
Age: 40
End: 2025-02-03
Payer: COMMERCIAL

## 2025-02-03 VITALS
HEIGHT: 72 IN | DIASTOLIC BLOOD PRESSURE: 70 MMHG | BODY MASS INDEX: 29.8 KG/M2 | OXYGEN SATURATION: 98 % | TEMPERATURE: 98.1 F | SYSTOLIC BLOOD PRESSURE: 126 MMHG | WEIGHT: 220 LBS | HEART RATE: 70 BPM

## 2025-02-03 DIAGNOSIS — K59.00 CONSTIPATION, UNSPECIFIED CONSTIPATION TYPE: Primary | ICD-10-CM

## 2025-02-03 DIAGNOSIS — K76.0 FATTY LIVER: ICD-10-CM

## 2025-02-03 PROCEDURE — 99214 OFFICE O/P EST MOD 30 MIN: CPT | Performed by: INTERNAL MEDICINE

## 2025-02-03 RX ORDER — LINACLOTIDE 145 UG/1
145 CAPSULE, GELATIN COATED ORAL
Qty: 30 CAPSULE | Refills: 3 | Status: SHIPPED | OUTPATIENT
Start: 2025-02-03

## 2025-02-03 NOTE — PATIENT INSTRUCTIONS
Restart Linzess - we can dose adjust as needed  Do a bowel preparation. Take 10mg of dulcolax and then mix a 238g bottle of Miralax with 64 ounces of gatorade or powerade - drink 1 cup every 15-20 minutes.  Drink at least 8 to 10 cups of water per day  I ordered an elastography of the liver    Please keep me updated

## 2025-02-03 NOTE — PROGRESS NOTES
Name: Cristel Daly      : 1985      MRN: [id number]  Encounter Provider: Romeo Finn MD  Encounter Date: 2/3/2025   Encounter department: [facility]  :  Assessment & Plan  Constipation, unspecified constipation type  Cristel Yousif is a 39 y.o. female with chronic constipation who presents for follow-up, last seen . When last seen she was doing well on Linzess and MiraLAX for chronic idiopathic constipation versus irritable bowel syndrome with constipation.  There may also be a component of colonic dysmotility and pelvic dyssynergia.      Right upper quadrant ultrasound with mild steatosis in the liver and borderline hepatomegaly.  CT scan 2 days prior relatively unremarkable.  Colonoscopy with a small polyp that was removed and prior colonoscopy with melanosis coli.     Most recent CMP normal.  Lipid profile normal.  CBC normal.  Hemoglobin A1c normal.  TSH normal.       Restart Linzess - we can dose adjust as needed  Do a bowel preparation. Take 10mg of dulcolax and then mix a 238g bottle of Miralax with 64 ounces of gatorade or powerade - drink 1 cup every 15-20 minutes.  Drink at least 8 to 10 cups of water per day  Repeat colonoscopy     Orders:    linaCLOtide (Linzess) 145 MCG CAPS; Take 1 capsule (145 mcg total) by mouth daily before breakfast    Fatty liver  Obtain elastography  Orders:    US elastography; Future        History of Present Illness     Cristel Daly is a 39 y.o. female who presents for constipation.     She stopped taking the linzess. Cramping, bloating, irregular stools. Added fiber. Drinking water. She wasn't taking the linzess consistently and it was causing the diarrhea.   Now having a BM every 6-7 days. Sometimes blood when she wipes. No pain. Lower left quadrant pain. Better after a BM but then back.     No heartburn, dysphagia. Occasional nausea, no vomiting. She has gained some weight.         Review of  "Systems  10 point ROS reviewed and negative, except as above         Objective   /70 (BP Location: Left arm, Patient Position: Sitting, Cuff Size: Standard)   Pulse 70   Temp 98.1 °F (36.7 °C) (Tympanic)   Ht 6' 1\" (1.854 m)   Wt 99.8 kg (220 lb)   SpO2 98%   BMI 29.03 kg/m²      PHYSICAL EXAMINATION:    General Appearance:   Alert, cooperative, no distress   HEENT:  Normocephalic, atraumatic, anicteric. Neck supple, symmetrical, trachea midline.   Lungs:   Equal chest rise and unlabored breathing, normal effort, no coughing.   Cardiovascular:   No visualized JVD.   Abdomen:   No abdominal distension.   Skin:   No jaundice, rashes, or lesions.    Musculoskeletal:   Normal range of motion visualized.   Psych:  Normal affect and normal insight.   Neuro:  Alert and appropriate.           "

## 2025-02-03 NOTE — ASSESSMENT & PLAN NOTE
Cristel Yousif is a 39 y.o. female with chronic constipation who presents for follow-up, last seen 2023. When last seen she was doing well on Linzess and MiraLAX for chronic idiopathic constipation versus irritable bowel syndrome with constipation.  There may also be a component of colonic dysmotility and pelvic dyssynergia.      Right upper quadrant ultrasound with mild steatosis in the liver and borderline hepatomegaly.  CT scan 2 days prior relatively unremarkable.  Colonoscopy with a small polyp that was removed and prior colonoscopy with melanosis coli.     Most recent CMP normal.  Lipid profile normal.  CBC normal.  Hemoglobin A1c normal.  TSH normal.       Restart Linzess - we can dose adjust as needed  Do a bowel preparation. Take 10mg of dulcolax and then mix a 238g bottle of Miralax with 64 ounces of gatorade or powerade - drink 1 cup every 15-20 minutes.  Drink at least 8 to 10 cups of water per day  Repeat colonoscopy 2029    Orders:    linaCLOtide (Linzess) 145 MCG CAPS; Take 1 capsule (145 mcg total) by mouth daily before breakfast

## 2025-03-02 ENCOUNTER — HOSPITAL ENCOUNTER (OUTPATIENT)
Dept: ULTRASOUND IMAGING | Facility: HOSPITAL | Age: 40
Discharge: HOME | End: 2025-03-02
Attending: INTERNAL MEDICINE
Payer: COMMERCIAL

## 2025-03-02 DIAGNOSIS — K76.0 FATTY LIVER: ICD-10-CM

## 2025-03-02 PROCEDURE — 76981 USE PARENCHYMA: CPT

## 2025-03-05 ENCOUNTER — RESULTS FOLLOW-UP (OUTPATIENT)
Dept: GASTROENTEROLOGY | Facility: CLINIC | Age: 40
End: 2025-03-05

## 2025-03-23 DIAGNOSIS — F39 MOOD DISORDER (HCC): ICD-10-CM

## 2025-05-15 ENCOUNTER — OFFICE VISIT (OUTPATIENT)
Dept: FAMILY MEDICINE CLINIC | Facility: CLINIC | Age: 40
End: 2025-05-15
Payer: COMMERCIAL

## 2025-05-15 VITALS
BODY MASS INDEX: 30.4 KG/M2 | RESPIRATION RATE: 16 BRPM | SYSTOLIC BLOOD PRESSURE: 118 MMHG | TEMPERATURE: 97.4 F | HEIGHT: 72 IN | DIASTOLIC BLOOD PRESSURE: 76 MMHG | WEIGHT: 224.4 LBS | HEART RATE: 98 BPM | OXYGEN SATURATION: 99 %

## 2025-05-15 DIAGNOSIS — Z11.4 SCREENING FOR HIV (HUMAN IMMUNODEFICIENCY VIRUS): ICD-10-CM

## 2025-05-15 DIAGNOSIS — D50.9 IRON DEFICIENCY ANEMIA, UNSPECIFIED IRON DEFICIENCY ANEMIA TYPE: ICD-10-CM

## 2025-05-15 DIAGNOSIS — E66.3 OVERWEIGHT: ICD-10-CM

## 2025-05-15 DIAGNOSIS — Z00.00 ROUTINE PHYSICAL EXAMINATION: Primary | ICD-10-CM

## 2025-05-15 DIAGNOSIS — Z12.9 SCREENING FOR MALIGNANT NEOPLASM: ICD-10-CM

## 2025-05-15 DIAGNOSIS — F39 MOOD DISORDER (HCC): ICD-10-CM

## 2025-05-15 PROBLEM — G47.33 OSA (OBSTRUCTIVE SLEEP APNEA): Status: RESOLVED | Noted: 2023-04-10 | Resolved: 2025-05-15

## 2025-05-15 PROCEDURE — 99395 PREV VISIT EST AGE 18-39: CPT | Performed by: NURSE PRACTITIONER

## 2025-05-15 RX ORDER — FERROUS SULFATE 325(65) MG
325 TABLET, DELAYED RELEASE (ENTERIC COATED) ORAL DAILY
COMMUNITY

## 2025-05-15 RX ORDER — BIOTIN 1 MG
TABLET ORAL
COMMUNITY
Start: 2024-09-04

## 2025-05-15 NOTE — PROGRESS NOTES
Adult Annual Physical  Name: Cristel Daly      : 1985      MRN: [id number]  Encounter Provider: RENEE Flores  Encounter Date: 5/15/2025   Encounter department: [facility]    :  Assessment & Plan  Routine physical examination  Lab work as ordered  Mammogram will be due in 10/2025 when she turns 40-- order placed today  PAP is UTD, follows with Gynecology (2024)  Colonoscopy completed last in , recall due  (follows with GI)  Orders:    CBC and differential; Future    Comprehensive metabolic panel; Future    Hemoglobin A1C; Future    Lipid panel; Future    Mood disorder (HCC) of depressive type in remission  Mood has been very stable, continue Zoloft 50 mg daily     Orders:    CBC and differential; Future    TSH, 3rd generation with Free T4 reflex; Future    Overweight  Previously followed with Weight Management but taking a break from this for now  She will continue with lifestyle modifications     Orders:    CBC and differential; Future    Hemoglobin A1C; Future    Lipid panel; Future    TSH, 3rd generation with Free T4 reflex; Future    Iron deficiency anemia, unspecified iron deficiency anemia type  Currently taking OTC ferrous sulfate 325 mg one tab daily which she tolerates   Updated lab work ordered today   Screening for malignant neoplasm    Orders:    Mammo screening bilateral w 3d and cad; Future    Screening for HIV (human immunodeficiency virus)    Orders:    HIV 1/2 AG/AB w Reflex [facility] for 2 yr old and above; Future        Preventive Screenings:    - Cervical cancer screening: screening up-to-date       Depression Screening and Follow-up Plan: Patient was screened for depression during today's encounter. They screened negative with a PHQ-2 score of 0.          History of Present Illness     Molly presents today for a routine physical and follow up  Doing well overall, no acute concerns today     Due for updated lab work  Mammogram will be due in  October as she will be turning 40    Continues to follow with GI for constipation, symptoms are better on Linzess     Previously followed with Weight Management.  Did well on Wegovy but had significant nausea on the 1.7 mg dosage.  No results with Topamax, Naltrexone or Wellbutrin.  She is hoping to work on lifestyle modifications this Summer       Adult Annual Physical:  Patient presents for annual physical.     Diet and Physical Activity:  - Diet/Nutrition: no special diet.  - Exercise: no formal exercise.    Depression Screening:  - PHQ-2 Score: 0    General Health:  - Sleep: sleeps well and 7-8 hours of sleep on average.  - Hearing: normal hearing bilateral ears.  - Vision: most recent eye exam < 1 year ago and wears glasses and contacts.  - Dental: regular dental visits and brushes teeth once daily.    /GYN Health:  - Follows with GYN: yes.   - Last menstrual cycle: 4/28/2025.   - History of STDs: no  - Contraception: male partner had vasectomy.      Advanced Care Planning:  - Has an advanced directive?: no    - Has a durable medical POA?: no      Review of Systems   Constitutional:  Negative for activity change, appetite change, chills, diaphoresis, fatigue, fever and unexpected weight change.   Eyes:  Negative for visual disturbance.   Respiratory:  Negative for cough, chest tightness, shortness of breath and wheezing.    Cardiovascular:  Negative for chest pain, palpitations and leg swelling.   Gastrointestinal:  Negative for abdominal pain, blood in stool, constipation, diarrhea and nausea.   Genitourinary:  Negative for dysuria.   Musculoskeletal:  Negative for arthralgias and myalgias.   Skin:  Negative for rash and wound.   Neurological:  Negative for dizziness, weakness, numbness and headaches.   Hematological:  Negative for adenopathy. Does not bruise/bleed easily.   Psychiatric/Behavioral:  Negative for dysphoric mood, self-injury, sleep disturbance and suicidal ideas. The patient is not  "nervous/anxious.              .    Objective   /76   Pulse 98   Temp (!) 97.4 °F (36.3 °C) (Temporal)   Resp 16   Ht 6' 1\" (1.854 m)   Wt 102 kg (224 lb 6.4 oz)   LMP 04/28/2025   SpO2 99%   BMI 29.61 kg/m²     Physical Exam  Constitutional:       General: She is not in acute distress.     Appearance: Normal appearance. She is well-developed. She is not ill-appearing, toxic-appearing or diaphoretic.   HENT:      Head: Normocephalic and atraumatic.      Mouth/Throat:      Mouth: Mucous membranes are moist.     Eyes:      Extraocular Movements: Extraocular movements intact.      Conjunctiva/sclera: Conjunctivae normal.      Pupils: Pupils are equal, round, and reactive to light.     Neck:      Thyroid: No thyromegaly.     Cardiovascular:      Rate and Rhythm: Normal rate and regular rhythm.      Heart sounds: Normal heart sounds. No murmur heard.  Pulmonary:      Effort: Pulmonary effort is normal. No respiratory distress.      Breath sounds: Normal breath sounds. No wheezing.   Abdominal:      General: Bowel sounds are normal. There is no distension.      Palpations: Abdomen is soft.      Tenderness: There is no abdominal tenderness.     Musculoskeletal:         General: Normal range of motion.      Cervical back: Normal range of motion and neck supple. No rigidity or tenderness.   Lymphadenopathy:      Cervical: No cervical adenopathy.     Skin:     General: Skin is warm and dry.     Neurological:      General: No focal deficit present.      Mental Status: She is alert and oriented to person, place, and time.     Psychiatric:         Mood and Affect: Mood normal.         Behavior: Behavior normal.         Thought Content: Thought content normal.         Judgment: Judgment normal.         "

## 2025-05-15 NOTE — ASSESSMENT & PLAN NOTE
Previously followed with Weight Management but taking a break from this for now  She will continue with lifestyle modifications     Orders:    CBC and differential; Future    Hemoglobin A1C; Future    Lipid panel; Future    TSH, 3rd generation with Free T4 reflex; Future

## 2025-05-15 NOTE — ASSESSMENT & PLAN NOTE
Mood has been very stable, continue Zoloft 50 mg daily     Orders:    CBC and differential; Future    TSH, 3rd generation with Free T4 reflex; Future

## 2025-06-12 DIAGNOSIS — E66.3 OVERWEIGHT WITH BODY MASS INDEX (BMI) OF 29 TO 29.9 IN ADULT: Primary | ICD-10-CM

## 2025-06-12 RX ORDER — SEMAGLUTIDE 0.25 MG/.5ML
INJECTION, SOLUTION SUBCUTANEOUS
Qty: 2 ML | Refills: 0 | Status: SHIPPED | OUTPATIENT
Start: 2025-06-12

## 2025-06-13 ENCOUNTER — TELEPHONE (OUTPATIENT)
Age: 40
End: 2025-06-13

## 2025-06-13 NOTE — TELEPHONE ENCOUNTER
PA for (Wegovy) 0.25 MG/0.5ML SUBMITTED to [facility]    via    []CMM-KEY:   [x]Surescripts-Case ID # [id number]  []Availity-Auth ID # NDC #   []Faxed to plan   []Other website   []Phone call Case ID #     [x]PA sent as URGENT    All office notes, labs and other pertaining documents and studies sent. Clinical questions answered. Awaiting determination from insurance company.     Turnaround time for your insurance to make a decision on your Prior Authorization can take 7-21 business days.

## 2025-06-16 NOTE — TELEPHONE ENCOUNTER
PA for (Wegovy) 0.25 MG/0.5ML DENIED    Reason:(Screenshot if applicable)        Message sent to office clinical pool Yes    Denial letter scanned into Media Yes    We can gladly do an appeal but the process can take about 30-60 days to provide determination. Please have the office staff schedule a Peer to Peer at phone [phone] . If an appeal is truly warranted please have Provider send clinical documentation to the PA department to support the appeal.     **Please follow up with your patient regarding denial and next steps**

## 2025-06-18 ENCOUNTER — OFFICE VISIT (OUTPATIENT)
Age: 40
End: 2025-06-18
Payer: COMMERCIAL

## 2025-06-18 DIAGNOSIS — M99.03 SEGMENTAL DYSFUNCTION OF LUMBAR REGION: ICD-10-CM

## 2025-06-18 DIAGNOSIS — M53.3 SACROILIAC JOINT DYSFUNCTION: ICD-10-CM

## 2025-06-18 DIAGNOSIS — M79.18 MYOFASCIAL PAIN: Primary | ICD-10-CM

## 2025-06-18 DIAGNOSIS — M99.02 SEGMENTAL DYSFUNCTION OF THORACIC REGION: ICD-10-CM

## 2025-06-18 PROCEDURE — 98941 CHIROPRACT MANJ 3-4 REGIONS: CPT | Performed by: CHIROPRACTOR

## 2025-06-18 PROCEDURE — 99203 OFFICE O/P NEW LOW 30 MIN: CPT | Performed by: CHIROPRACTOR

## 2025-06-18 NOTE — PATIENT INSTRUCTIONS
The patient was advised to apply ice to the region in 15-minute intervals a minimum of 3 times per day.   The patient was advised to avoid prolonged sitting and take micro breaks every 30 minutes.  The patient was advised to avoid sleeping in the prone position. Proper sleeping postures and use of pillows were discussed.  The patient was informed that she may experience additional soreness following the today's treatment visit. The need to continue with the stretching exercises and apply ice in 15-minute intervals was discussed with the patient.   The patient was informed they may see redness or possible bruising in the region of Graston technique treatment.

## 2025-06-18 NOTE — TELEPHONE ENCOUNTER
Can we please let pt know the Wegovy was denied by insurance? I previously spoke with her about trying Phentermine if the Wegovy was denied.  If she would like to proceed with the Phentermine, I can send a script to her pharmacy

## 2025-06-18 NOTE — PROGRESS NOTES
"     1. Myofascial pain        2. Segmental dysfunction of lumbar region        3. Sacroiliac joint dysfunction        4. Segmental dysfunction of thoracic region          Assessment/Plan:    Review of Diagnostic Studies and Office Notes:    The patient's March 2, 2025 ultrasound elastography/UG AP report, September 7, 2022 CT scan report of the abdomen pelvis with contrast, Angela 15, 2020 fluoroscopic spine pain procedure note (radiofrequency denervation of the right L3-4 medial branch/dorsal ramus nerve, March 4, 2020 fluoroscopic spine pain procedure note fluoroscopically guided right L3-5 medial branch nerve/dorsal ramus blocks, March 19, 2016 MRI report of the lumbar spine without contrast, December 22, 2015 x-ray report of the lumbar spine, RENEE Flores May 15, 2025 family medicine office note (routine physical examination, mood disorder of depressive type in remission, overweight, iron deficiency anemia), February 3, 2025 gastroenterology office note (constipation, unspecified type, fatty liver), August 13, 2024 OB/GYN annual exam report (women's annual routine gynecological examination), RENEE Yan July 19,019 spine pain medicine office note (spondylosis lumbar region without myelopathy or radiculopathy, lumbar spondylosis, \"1.  Patient continues with ongoing relief status post right L3-5 RFA with Dr. Stevenson on Angela 3, 2019. We can repeat this injection every 6-9 months if needed. 2. Patient may continue ibuprofen 800 mg q.8 hours p.r.n. And Tylenol p.r.n. 3. At this time, the patient will follow up on an as-needed basis. The patient was advised to contact the office should their symptoms worsen in the interim. The patient was agreeable and verbalized an understanding.\" were reviewed prior to the chiropractic evaluation today.    Narrative & Impression   ELASTOGRAPHY, LIVER ULTRASOUND     INDICATION: K76.0: Fatty (change of) liver, not elsewhere classified.     COMPARISON: None   "   TECHNIQUE: Targeted ultrasound of the liver was performed with a curvilinear transducer on a Arisoko e10 utilizing 2D SWE. A total of 10 shear wave Elastography samples were obtained.     FINDINGS:     Shear Wave Elastography sampling was performed to evaluate stiffness changes within the liver associated with fibrosis.  E1 4.6 kPa  E2 4.34 kPa  E3 4.38 kPa  E4 4.32 kPa  E5 4.19 kPa  E6 4.48 kPa  E7 4.35 kPa  E8 4.71 kPa  E9 4.74 kPa  E10 4.29 kPa     E median: 4.36 kPa.  The corresponding Metavir score is F0-F1 (absent or mild fibrosis), range 0-8.26 kPa. < 1.66 m/s.  IQR/median: 5.5% (IQR of less than 30% is considered a satisfactory data set).     Note: The stage of liver fibrosis may be overestimated by clinical conditions unrelated to fibrosis, including but not limited to, nonfasting, hepatic inflammation, vascular congestion, biliary obstruction, and infiltrative liver disease. Furthermore, in   some patients with NAFLD, the cut-off values for compensated advanced chronic liver disease may be lower (7-9 kPa). In causes other than viral hepatitis and nonalcoholic fatty liver disease, the cut-off values are not well established. When comparing   measurements across time, a clinically significant change should be considered when the delta change is greater than 10%. In all these conditions, however, liver stiffness values within the normal range exclude significant liver fibrosis.     Ultrasound-guided attenuation parameter (UGAP) Liver Steatosis Grading  A1 0.65 dB/cm/MHz  A2 0.68 dB/cm/MHz  A3 0.66 dB/cm/MHz  A4 0.58 dB/cm/MHz  A5 0.67 dB/cm/MHz  A6 0.67 dB/cm/MHz  A7 0.64 dB/cm/MHz  A8 0.63 dB/cm/MHz  A9 0.69 dB/cm/MHz  A10 0.77 dB/cm/MHz  Attenuation coefficient: 0.67 dB/cm/MHz  Liver steatosis grading: S1 (5%-33% steatosis) range 0.65-0.70 dB/cm/MHz  IQR/Med: 5% (Less than or equal to 30% is a satisfactory data set).     Reference White paper for GE ultrasound-guided attenuation  parameter  [email or web address]-[email or web address]     IMPRESSION:     Metavir score: F0 - F1, Absent or Mild Fibrosis     According to the updated SRU consensus statement, a liver stiffness of 4.36 kPa, suggesting a high probability of being normal.  https://pubs.rsna.org/doi/10.1148/radiol.7374433502     Liver steatosis grading: S1 ( 5%-33% steatosis)           Workstation performed: [id number]     Narrative & Impression   MRI LUMBAR SPINE WITHOUT CONTRAST     INDICATION:  History of L4-5 disc herniation with back pain radiating to the left buttock.  dx M54.16; M54.5; M51.26; M47.816     COMPARISON:  3/13/2013     TECHNIQUE:  Sagittal T1, sagittal T2, sagittal inversion recovery, axial T1 and axial T2, coronal T2.       IMAGE QUALITY:  Diagnostic     FINDINGS:     ALIGNMENT:  Normal alignment of the lumbar spine.  No compression fracture.  No spondylolysis or spondylolisthesis.  No scoliosis.     MARROW SIGNAL:  Normal marrow signal is identified within the visualized bony structures.  No discrete marrow lesion.     DISTAL CORD AND CONUS:  Normal size and signal within the distal cord and conus.  The conus ends at the L2 level.     PARASPINAL SOFT TISSUES:  Paraspinal soft tissues are unremarkable.     SACRUM:  Normal signal within the sacrum. No evidence of insufficiency or stress fracture.     LOWER THORACIC DISC SPACES:  Normal disc height and signal.  No disc herniation, canal stenosis or foraminal narrowing.     LUMBAR DISC SPACES:          L1-L2:  Normal.     L2-L3:  Normal.     L3-L4:  Normal.     L4-L5:  Disc desiccation and loss of disc height.  Mild diffuse annular bulging.  Mild facet arthropathy.  Mild canal stenosis.  No foraminal narrowing or discrete foraminal nerve impingement.  The previously seen central disc herniation is improved.     L5-S1:  Normal.     IMPRESSION:     Improving L4-5 degenerative disc disease.  Previously seen central disc  protrusion no longer present with only mild diffuse annular bulging present at this time.  Mild canal stenosis, also improved.  No discrete nerve impingement.        Workstation performed: [id number]          Narrative & Impression   EXAM ROOM =    EXAM START =    EXAM STOP =    FILMS USED =       LUMBAR SPINE      INDICATION-  Chronic low back pain, L4-5 herniation, left buttocks pain.      COMPARISON-  Prior lumbar spine study 3/6/2010      VIEWS-  AP, lateral and coned-down projections^ 3 images      FINDINGS-      Minor scoliotic deformity is noted.  Alignment is otherwise   unremarkable.      There is no radiographic evidence of acute fracture or destructive   osseous lesion.  No instability on flexion and extension views.      Mild discogenic degenerative changes at L4-5 and L5-S1.      Visualized soft tissues appear unremarkable.      IMPRESSION-   Mild discogenic degenerative changes at L4-5 and L5-S1 without acute   osseous abnormality.      Transcribed on- [id number]            - BRADLEY KOCHER, RAD MD        Reading Radiologist- BRADLEY KOCHER, RAD MD        Electronically Signed- BRADLEY KOCHER, RAD MD        Released Date Time- 12/22/15 0957    ------------------------------------------------------------------------------   READ BY = [id number]^BRADLEY KOCHER   RELEASED BY = [id number]^BRADLEY KOCHER   EXAM ROOM = [facility]   EXAM START = [id number]   EXAM STOP = [id number]   FILMS USED = 7 VIEWS      LUMBAR SPINE      INDICATION-  Chronic low back pain, L4-5 herniation, left buttocks pain.      COMPARISON-  Prior lumbar spine study 3/6/2010      VIEWS-  AP, lateral and coned-down projections^ 3 images      FINDINGS-      Minor scoliotic deformity is noted.  Alignment is otherwise   unremarkable.      There is no radiographic evidence of acute fracture or destructive   osseous lesion.  No instability on flexion and extension views.      Mild discogenic degenerative changes at L4-5 and L5-S1.       Visualized soft tissues appear unremarkable.      IMPRESSION-   Mild discogenic degenerative changes at L4-5 and L5-S1 without acute   osseous abnormality.      Transcribed on- [id number]        Medical Decision Making and Treatment Plan:    The patient's most limited range of motion was lumbar extension which is commonly associated with inflexibility in the iliopsoas/hip flexors.  I will begin addressing the iliopsoas muscle with myofascial release.  The patient has myofascial findings as well as lumbar facet restrictions, lower thoracic facet restrictions, and sacroiliac joint restriction which can contribute to limited range of motion and pain. Thoracic extension is important in extension movements of the spine and therefore the thoracic restrictions will also be addressed.  The patient has mild inflexibility in the right hamstring but has normal flexibility in the left hamstring.  The patient was taught hamstring stretches for home use.  Additionally the patient had tingling paresthesias upon dermatome testing of the right L5 dermatome.  The patient has a history of DDD at the L4-5 level and therefore there may be some effect on the right L5 nerve root at this level.  No neuroforaminal narrowing at the L4-5 level was noted on the 2020 MRI report of the lumbar spine.  Although there may be some progression of the disc over time.  The patient did not exhibit positive nerve root tension findings on orthopedic testing today.    The patient will be treated with conservative chiropractic care including myofascial release, joint mobilization, and a home stretching and icing program.    The patient was taught lower back, gluteus medius/piriformis, and assisted/unassisted hamstring stretches today. The patient was advised to do the stretch for 3 sets of 15-20 seconds several times per day.The need to stretch to the point of tension only was discussed. ROM was measured with an Acumar digital dual  inclinometer.    The patient will initially be seen 2 times per week and the frequency of treatment will be reduced to 1 time per week as there are decreased symptoms and improvement in objective findings. The patient will then be discharged.    Patient Instructions:    The patient was advised to apply ice to the region in 15-minute intervals a minimum of 3 times per day.   The patient was advised to avoid prolonged sitting and take micro breaks every 30 minutes.  The patient was advised to avoid sleeping in the prone position. Proper sleeping postures and use of pillows were discussed.  The patient was informed that she may experience additional soreness following the today's treatment visit. The need to continue with the stretching exercises and apply ice in 15-minute intervals was discussed with the patient.   The patient was informed they may see redness or possible bruising in the region of Graston technique treatment.     Goals:    JMLGOALS: Improve patient's ability to tolerate sleeping, Improve patient's ability to tolerate prolonged walking, Improve patient's ability to tolerate prolonged standing, Improve lumbar range of motion, and improve patient's ability to tolerate bowling    TIME/Reviewed with Patient:  At least 30 minutes of time was spent with the patient during the consultation including the patient's history/current complaints, physical exam, reviewing the diagnostic reports/office notes, reviewing home instruction/reducing risk factors with the patient, reviewing my findings with the patient, and discussing the treatment/treatment plan with the patient.    This is a separate identifiable portion of today's visit from the E and M code billed.    Treatment today consisted of myofascial release via Graston Technique to the bilateral lumbar erector spinae, quadratus lumborum, and superior portion of the gluteus medius (superficial). GT 3 was used.     Manipulation was performed to the left  "sacroiliac joint via Perdomo low force drop technique. Contact was made to the left ischial tuberosity and the right lateral aspect of the  apex of the sacrum. Manipulation was performed to the lumbar restrictions via manual lumbar flexion distraction technique. Manipulation was performed to the lower thoracic restrictions via grade 1 mobilization techniques in the prone position.    Subjective:      Cristel Daly is a 39 y.o. female who presents today with pain in the bilateral lumbosacral region.  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  She indicated that it was physical work.  The patient confirmed that she had a radiofrequency procedure in 2020.  She stated the radiofrequency procedure made her lower back pain \"more manageable.\"  She stated she likes to bowl and the radiofrequency procedure helped her ability to tolerate bowling.  She states she did not have full resolution of symptoms but was able to manage her pain with Tylenol and ibuprofen after the difficulty procedure.  The patient was question if she experiences any pain or paresthesias in the lower extremities.  She denied experiencing pain or paresthesias in the lower extremities recently.  However she stated that prior to the radiofrequency procedure in 2020 she was aware of tingling paresthesias in the left gluteal region and into the left calf region.  The patient states she has increased pain in the lumbosacral region with sleeping, walking, standing, and bowling.  The patient stated she sleeps in the side-lying positions or in the supine position.  She states she has decreased symptoms with static positions for less than 15 minutes.  The patient stated that the pain can vary in intensity from the left side to the right side.  Today she stated she has more pronounced pain in the left lumbosacral region.  She denied having changes in bowel or bladder function.  " "The patient describes her pain level as a 2 on a 0-10 pain scale today.  On her intake paperwork she describes her pain level as a 3 on a 0-10 pain scale today.  She describes her pain level as an 8 on a 0-10 pain scale at its worst.     Objective:    Appearance: Well developed, well nourished, and in no acute distress    Alert and oriented x 3    Mood/Affect: calm and pleasant    Gait/Posture:    Gait: Normal    Antalgic posture: None    Lordosis: Lumbar- normal    Kyphosis: Thoracic- normal    Lower extremity reflexes:    Deep tendon reflexes were normal and symmetrical in the bilateral lower extremities.    Lower Extremity Muscle Testing:    Muscle testing of the bilateral lower extremities was normal and graded +5/5.    Sensory:    Sensation to light touch was normal and symmetrical in the bilateral S1 dermatomes but sensation to light touch \"a little tingling\") was decreased in the right L5 dermatomes when compared bilaterally.    Babinski was negative bilaterally.    Lumbar Orthopedic Tests:    Seated Straight Leg Raise was negative  bilaterally.    Supine Straight Leg Raise was negative  on the Right. Mild inflexibility was noted in the right hamstring.    Supine Straight Leg Raise was negative  on the Left.  Left hamstring flexibility was within normal limits.    Rony Test was negative  bilaterally.    Active Lumbar Ranges of Motion:    Flexion: Allowed the patient to touch the distal 1/3 to distal 1/4 of the tibia    Extension: 23 degrees.  Report of the onset of lumbar discomfort was noted at approximately 19 degrees of active lumbar extension.    Right lateral flexion: 19 degrees    Left Lateral flexion: 22 degrees    Palpation:    Negative Derefield on the left. Active myofascial trigger points, hypertonicity, and tenderness were present in the bilateral lumbar erector spinae, quadratus lumborum, and gluteus medius. Pressure to the above listed trigger points reproduced some of the pain described in " today's chief complaint. Intersegmental motion was decreased in the thoracic spine including joint dysfunctions at T10-11 and T11-12. Intersegmental motion was decreased in the lumbar spine including joint dysfunctions at L4-5 and L5-S1. Intersegmental motion was decreased in the left sacroiliac joint.       Dictation voice to text software has been used in the creation of this document. Please consider this in light of any contextual or grammatical errors.

## 2025-06-19 NOTE — TELEPHONE ENCOUNTER
Molly returned phone call, stated that she would like to proceed with the Phentermine and would like for it to go to the [facility] mail order pharmacy.    [facility] - [place], [place] - [address] [employer]. [address] [facility] [address], [place] [address]      Please advise.

## 2025-06-20 ENCOUNTER — PROCEDURE VISIT (OUTPATIENT)
Age: 40
End: 2025-06-20
Payer: COMMERCIAL

## 2025-06-20 ENCOUNTER — HOSPITAL ENCOUNTER (OUTPATIENT)
Dept: RADIOLOGY | Facility: IMAGING CENTER | Age: 40
End: 2025-06-20
Payer: COMMERCIAL

## 2025-06-20 VITALS — WEIGHT: 226 LBS | BODY MASS INDEX: 30.61 KG/M2 | HEIGHT: 72 IN

## 2025-06-20 DIAGNOSIS — M79.18 MYOFASCIAL PAIN: Primary | ICD-10-CM

## 2025-06-20 DIAGNOSIS — M53.3 SACROILIAC JOINT DYSFUNCTION: ICD-10-CM

## 2025-06-20 DIAGNOSIS — M99.03 SEGMENTAL DYSFUNCTION OF LUMBAR REGION: ICD-10-CM

## 2025-06-20 DIAGNOSIS — M79.18 MYOFASCIAL PAIN: ICD-10-CM

## 2025-06-20 DIAGNOSIS — M99.02 SEGMENTAL DYSFUNCTION OF THORACIC REGION: ICD-10-CM

## 2025-06-20 PROCEDURE — 98941 CHIROPRACT MANJ 3-4 REGIONS: CPT | Performed by: CHIROPRACTOR

## 2025-06-20 PROCEDURE — 72100 X-RAY EXAM L-S SPINE 2/3 VWS: CPT

## 2025-06-20 PROCEDURE — 97110 THERAPEUTIC EXERCISES: CPT | Performed by: CHIROPRACTOR

## 2025-06-20 NOTE — TELEPHONE ENCOUNTER
Patient called the refill line stating that it is ok to send the phentermine to [facility].

## 2025-06-20 NOTE — PROGRESS NOTES
Assessment:  The patient is bracing as expected.  The patient is last imaging of the lumbar spine (MRI of the lumbar spine) is from 2016.  Therefore the patient was referred for updated x-ray lumbar spine.    1. Myofascial pain        2. Segmental dysfunction of lumbar region        3. Sacroiliac joint dysfunction        4. Segmental dysfunction of thoracic region          Plan:  Treatment today consisted of myofascial release via ischemic compression to the bilateral lumbar erector spinae.    Manipulation was performed to the left sacroiliac joint via Perdomo low force drop technique. Contact was made to the left ischial tuberosity and the right lateral aspect of the  apex of the sacrum. Manipulation was performed to the lumbar restrictions via manual lumbar flexion distraction technique. Manipulation was performed to the lower thoracic restrictions via grade 1 mobilization techniques in the prone position.    Therapeutic procedures were performed with the goal of improving flexibility and range of motion and therefore function. I performed PNF stretching to the gluteus medius and piriformis.  Additionally the patient performed active movements with passive assisted stretching to the bilateral bilateral lumbar erector spinae and iliopsoas/hip flexors. The above mentioned were performed with the goal of improving range of motion and flexibility. The therapeutic procedures/exercises were performed for at least 8 minutes.    Subjective:  The patient states she had some additional achiness and soreness in the lower back the remainder of the day following the initial chiropractic evaluation/treatment.  However she stated it subsided the next day.  She reported that she bowled last night and had the typical soreness in the lower back/lumbosacral region.  The patient has pain in the bilateral lumbosacral region. She has increased pain in the lumbosacral region with sleeping, walking, standing, and bowling. She has  decreased symptoms with static positions for short periods of time. The patient describes her pain level as a 2 or 3 on a 0-10 pain scale today.      Objective:  Negative Derefield on the left. Active myofascial trigger points, hypertonicity, and tenderness were present in the bilateral lumbar erector spinae, quadratus lumborum, and gluteus medius. Intersegmental motion was decreased in the thoracic spine including joint dysfunctions at T10-11 and T11-12. Intersegmental motion was decreased in the lumbar spine including joint dysfunctions at L4-5 and L5-S1. Intersegmental motion was decreased in the left sacroiliac joint.  The bilateral iliopsoas/hip flexor flexibility were at least 50% limited.  After therapeutic procedures bilateral iliopsoas/hip flexor flexibility and gluteal flexibility improved.       I have used the Epic copy/forward function to compose this note.  I have reviewed my current note to ensure it reflects the current patient status, exam, assessment and plan.    Dictation voice to text software has been used in the creation of this document. Please consider this in light of any contextual or grammatical errors.

## 2025-06-23 ENCOUNTER — PROCEDURE VISIT (OUTPATIENT)
Age: 40
End: 2025-06-23
Payer: COMMERCIAL

## 2025-06-23 VITALS — HEIGHT: 72 IN | BODY MASS INDEX: 30.61 KG/M2 | WEIGHT: 226 LBS

## 2025-06-23 DIAGNOSIS — M53.3 SACROILIAC JOINT DYSFUNCTION: ICD-10-CM

## 2025-06-23 DIAGNOSIS — E66.3 OVERWEIGHT: Primary | ICD-10-CM

## 2025-06-23 DIAGNOSIS — M99.03 SEGMENTAL DYSFUNCTION OF LUMBAR REGION: ICD-10-CM

## 2025-06-23 DIAGNOSIS — M99.02 SEGMENTAL DYSFUNCTION OF THORACIC REGION: ICD-10-CM

## 2025-06-23 DIAGNOSIS — M79.18 MYOFASCIAL PAIN: Primary | ICD-10-CM

## 2025-06-23 PROCEDURE — 98941 CHIROPRACT MANJ 3-4 REGIONS: CPT | Performed by: CHIROPRACTOR

## 2025-06-23 RX ORDER — PHENTERMINE HYDROCHLORIDE 15 MG/1
15 CAPSULE ORAL EVERY MORNING
Qty: 30 CAPSULE | Refills: 0 | Status: SHIPPED | OUTPATIENT
Start: 2025-06-23

## 2025-06-23 NOTE — PROGRESS NOTES
Assessment:  The patient is progressing as expected.  The patient's June 20, 2025 x-ray films of the lumbar spine were reviewed prior to chiropractic treatment today.  The patient's x-ray films of the lumbar spine were also reviewed with the patient today.    The patient was referred for AP/lateral lumbar/lumbosacral views.    My clinical impression:  Moderate DDD was noted at the L4-5 level with mild to moderate osteoarthritic spurring on the anterior portion of the adjacent endplates of the L4-5 level.  Mild to moderate/moderate DDD is also noted at the L5-S1 level.  As indicated on the initial chiropractic evaluation patient had decreased sensation to light touch in the right L5 dermatome when compared to the left.  It is expected that there is disc bulging/protrusion at the L4-5 and L5-S1 levels due to the DDD noted on the x-rays of the lumbar spine.  The patient will be treated as though she has a disc protrusions at the L4-5 and L5-S1 levels which can affect the right L5 nerve root causing the decreased sensation to light touch noted on examination.  Keeping this in mind I began performing manipulation to the sacroiliac joint via grade 2 mobilization techniques and low force diversified maneuvers.  The patient had this form of manipulation in the past and tolerated it well.    1. Myofascial pain        2. Segmental dysfunction of lumbar region        3. Sacroiliac joint dysfunction        4. Segmental dysfunction of thoracic region          Plan:  Treatment today consisted of myofascial release via Graston Technique to the bilateral lumbar erector spinae, quadratus lumborum, and superior portion of the gluteus medius. GT 3 was used.      Manipulation was performed to the left sacroiliac joint via Perdomo low force drop technique. Contact was made to the left ischial tuberosity and the right lateral aspect of the apex of the sacrum. Manipulation was performed to the lumbar restrictions via manual lumbar  flexion distraction technique. Manipulation was performed to the lower thoracic restrictions via grade 1 mobilization techniques in the prone position.  No high velocity thrust was applied.  Manipulation was performed to the sacroiliac joint restriction via grade 2 mobilization techniques and low force diversified maneuvers.  No high velocity thrust was applied.  An audible release occurred and was well-tolerated.    Subjective:  The patient states she feels the same as the last chiropractic treatment.  She stated she did not bowl since the last chiropractic visit.  She stated she typically bowls on Thursday nights.  The patient was questioned if she had side posture osseous manipulation in the past.  She initially could not remember but afterward she indicated that she had this form of chiropractic manipulation in the past. The patient has pain in the bilateral lumbosacral region. She has increased pain in the lumbosacral region with sleeping, walking, standing, and bowling. She has decreased symptoms with static positions for short periods of time. The patient describes her pain level as a 2 or 3 on a 0-10 pain scale today.      Objective:  Negative Derefield on the left. Active myofascial trigger points, hypertonicity, and tenderness were present in the bilateral lumbar erector spinae, quadratus lumborum, and gluteus medius. Intersegmental motion was decreased in the thoracic spine including joint dysfunctions at T8-9 and T11-12. Intersegmental motion was decreased in the lumbar spine including joint dysfunctions at L4-5 and L5-S1. Intersegmental motion was decreased in the left sacroiliac joint.      I have used the Epic copy/forward function to compose this note.  I have reviewed my current note to ensure it reflects the current patient status, exam, assessment and plan.    Dictation voice to text software has been used in the creation of this document. Please consider this in light of any contextual or  grammatical errors.

## 2025-06-24 ENCOUNTER — TELEPHONE (OUTPATIENT)
Age: 40
End: 2025-06-24

## 2025-06-24 NOTE — TELEPHONE ENCOUNTER
PA for phentermine 15 MG capsule SUBMITTED to [facility]    via    []CMM-KEY:   [x]Surescripts-Case ID # [id number]   []Availity-Auth ID # NDC #   []Faxed to plan   []Other website   []Phone call Case ID #     [x]PA sent as URGENT    All office notes, labs and other pertaining documents and studies sent. Clinical questions answered. Awaiting determination from insurance company.     Turnaround time for your insurance to make a decision on your Prior Authorization can take 7-21 business days.

## 2025-06-25 ENCOUNTER — PROCEDURE VISIT (OUTPATIENT)
Age: 40
End: 2025-06-25
Payer: COMMERCIAL

## 2025-06-25 DIAGNOSIS — M79.18 MYOFASCIAL PAIN: Primary | ICD-10-CM

## 2025-06-25 DIAGNOSIS — M99.03 SEGMENTAL DYSFUNCTION OF LUMBAR REGION: ICD-10-CM

## 2025-06-25 DIAGNOSIS — M99.02 SEGMENTAL DYSFUNCTION OF THORACIC REGION: ICD-10-CM

## 2025-06-25 DIAGNOSIS — M53.3 SACROILIAC JOINT DYSFUNCTION: ICD-10-CM

## 2025-06-25 PROCEDURE — 98941 CHIROPRACT MANJ 3-4 REGIONS: CPT | Performed by: CHIROPRACTOR

## 2025-06-25 PROCEDURE — 97110 THERAPEUTIC EXERCISES: CPT | Performed by: CHIROPRACTOR

## 2025-06-25 NOTE — PROGRESS NOTES
Assessment:  The patient is progressing as expected.  Although the iliopsoas and hip flexors and gluteal musculature are being targeted the patient was advised to continue to improve/maintain hamstring flexibility.  Bowling requires flexibility in the hamstrings to avoid abnormal stress in the lumbar region.  She indicates she understood.    1. Myofascial pain        2. Segmental dysfunction of lumbar region        3. Sacroiliac joint dysfunction        4. Segmental dysfunction of thoracic region          Plan:  Treatment today consisted of myofascial release via ischemic compression to the bilateral lumbar erector spinae.      Manipulation was performed to the left sacroiliac joint via Perdomo low force drop technique. Contact was made to the left ischial tuberosity and the right lateral aspect of the apex of the sacrum. Manipulation was performed to the lumbar restrictions via manual lumbar flexion distraction technique.  Manipulation was performed to the thoracic and sacroiliac joint restriction via grade 2 mobilization techniques and low force diversified maneuvers.  No high velocity thrust was applied.  An audible release occurred with manipulation of the thoracic restrictions and was well-tolerated.    Therapeutic procedures were performed with the goal of improving flexibility and range of motion and therefore function. I performed PNF stretching to the gluteus medius and piriformis.  Additionally the patient performed active movements with passive assisted stretching to the bilateral bilateral lumbar erector spinae and iliopsoas/hip flexors. The above mentioned were performed with the goal of improving range of motion and flexibility. The therapeutic procedures/exercises were performed for at least 8 minutes.    Subjective:  The patient states she feels the same as the last chiropractic treatment.  She states she has not tested her back condition because she did not bowl since the last chiropractic visit.   Once again she typically bowls on Thursday nights.  The patient was questioned if she had side posture osseous manipulation in the past.  She initially could not remember but afterward she indicated that she had this form of chiropractic manipulation in the past. The patient has pain in the bilateral lumbosacral region. She has increased pain in the lumbosacral region with sleeping, walking, standing, and bowling. She has decreased symptoms with static positions for short periods of time. The patient describes her pain level as a 2 on a 0-10 pain scale today.      Objective:  Negative Derefield on the left. Active myofascial trigger points, hypertonicity, and tenderness were present in the bilateral lumbar erector spinae, quadratus lumborum, and gluteus medius.  The right lumbar spine and were more hypertonic when compared to the left.  Intersegmental motion was decreased in the thoracic spine including joint dysfunctions at T8-9 and T11-12. Intersegmental motion was decreased in the lumbar spine including joint dysfunctions at L2-3 and L5-S1. Intersegmental motion was decreased in the left sacroiliac joint.  After therapeutic procedures iliopsoas and gluteal flexibility improved.      I have used the Epic copy/forward function to compose this note.  I have reviewed my current note to ensure it reflects the current patient status, exam, assessment and plan.    Dictation voice to text software has been used in the creation of this document. Please consider this in light of any contextual or grammatical errors.

## 2025-06-26 NOTE — TELEPHONE ENCOUNTER
PA for phentermine 15 MG capsule DENIED    Reason:(Screenshot if applicable)        Message sent to office clinical pool Yes    Denial letter scanned into Media Yes    We can gladly do an appeal but the process can take about 30-60 days to provide determination. Please have the office staff schedule a Peer to Peer at phone [phone] . If an appeal is truly warranted please have Provider send clinical documentation to the PA department to support the appeal.     **Please follow up with your patient regarding denial and next steps**

## 2025-07-05 DIAGNOSIS — F39 MOOD DISORDER (HCC): ICD-10-CM

## 2025-07-07 ENCOUNTER — PROCEDURE VISIT (OUTPATIENT)
Age: 40
End: 2025-07-07
Payer: COMMERCIAL

## 2025-07-07 VITALS — BODY MASS INDEX: 30.61 KG/M2 | WEIGHT: 226 LBS | HEIGHT: 72 IN

## 2025-07-07 DIAGNOSIS — M53.3 SACROILIAC JOINT DYSFUNCTION: ICD-10-CM

## 2025-07-07 DIAGNOSIS — M79.18 MYOFASCIAL PAIN: Primary | ICD-10-CM

## 2025-07-07 DIAGNOSIS — M99.03 SEGMENTAL DYSFUNCTION OF LUMBAR REGION: ICD-10-CM

## 2025-07-07 DIAGNOSIS — M99.02 SEGMENTAL DYSFUNCTION OF THORACIC REGION: ICD-10-CM

## 2025-07-07 PROCEDURE — 98941 CHIROPRACT MANJ 3-4 REGIONS: CPT | Performed by: CHIROPRACTOR

## 2025-07-07 NOTE — PROGRESS NOTES
Assessment:  The patient is progressing as expected.  The patient was advised to focus on improving gluteal flexibility by doing the gluteus medius/piriformis stretch frequently. The need to stretch to the point of tension only and not to the point of pain was emphasized.     1. Myofascial pain        2. Segmental dysfunction of lumbar region        3. Sacroiliac joint dysfunction        4. Segmental dysfunction of thoracic region          Plan:  Treatment today consisted of myofascial release via Graston Technique to the right lumbar erector spinae, quadratus lumborum, left anterior/lateral gluteus medius, and bilateral gluteus medius. GT 3 and GT 4 were used.      Manipulation was performed to the left sacroiliac joint via Perdomo low force drop technique. Contact was made to the left ischial tuberosity and the right lateral aspect of the apex of the sacrum. Manipulation was performed to the lumbar restrictions via manual lumbar flexion distraction technique.  Manipulation was performed to the thoracic and sacroiliac joint restriction via grade 2 mobilization techniques and low force diversified maneuvers.  No high velocity thrust was applied.  An audible release occurred with manipulation of the thoracic restrictions and was well-tolerated.    Subjective:  The patient states she feels pain in the right lumbosacral region.  However she stated she began to experience pain in the left superior lateral gluteal region 2 days ago.  She stated she was on vacation and did a lot of driving as they rode to [facility] in [place].  She stated that she noticed the pain in the left superior lateral gluteal region a little bit this morning when she was walking her dog and also when she was walking up a hill yesterday.  Once again she states she has not tested her back condition because she did not bowl since the last chiropractic visit.  She typically bowls on Thursday nights.  She has increased pain in the  lumbosacral region with sleeping, walking, standing, and bowling. She has decreased symptoms with static positions for short periods of time. The patient describes her pain level as a 2 on a 0-10 pain scale today.      Objective:  Negative Derefield on the left. Active myofascial trigger points, hypertonicity, and tenderness were present in the bilateral lumbar erector spinae, quadratus lumborum, gluteus medius, left anterior/lateral gluteus medius, and left tensor fascia brandon.  Intersegmental motion was decreased in the thoracic spine including joint dysfunctions at T8-9 and T11-12. Intersegmental motion was decreased in the lumbar spine including joint dysfunctions at L2-3 and L5-S1. Intersegmental motion was decreased in the left sacroiliac joint.  After therapeutic procedures iliopsoas and gluteal flexibility improved.      I have used the Epic copy/forward function to compose this note.  I have reviewed my current note to ensure it reflects the current patient status, exam, assessment and plan.    Dictation voice to text software has been used in the creation of this document. Please consider this in light of any contextual or grammatical errors.

## 2025-07-11 ENCOUNTER — PROCEDURE VISIT (OUTPATIENT)
Age: 40
End: 2025-07-11
Payer: COMMERCIAL

## 2025-07-11 VITALS — HEIGHT: 72 IN | BODY MASS INDEX: 30.61 KG/M2 | WEIGHT: 226 LBS

## 2025-07-11 DIAGNOSIS — M53.3 SACROILIAC JOINT DYSFUNCTION: ICD-10-CM

## 2025-07-11 DIAGNOSIS — M99.02 SEGMENTAL DYSFUNCTION OF THORACIC REGION: ICD-10-CM

## 2025-07-11 DIAGNOSIS — M99.03 SEGMENTAL DYSFUNCTION OF LUMBAR REGION: ICD-10-CM

## 2025-07-11 DIAGNOSIS — K59.00 CONSTIPATION, UNSPECIFIED CONSTIPATION TYPE: ICD-10-CM

## 2025-07-11 DIAGNOSIS — M79.18 MYOFASCIAL PAIN: Primary | ICD-10-CM

## 2025-07-11 PROCEDURE — 98941 CHIROPRACT MANJ 3-4 REGIONS: CPT | Performed by: CHIROPRACTOR

## 2025-07-11 NOTE — PROGRESS NOTES
Assessment:  The patient is progressing as expected.  The patient was advised to focus on improving gluteal flexibility by doing the gluteus medius/piriformis stretch frequently. The need to stretch to the point of tension only and not to the point of pain was emphasized. The patient was advised to do the stretch at 3 sets of 15 to 20 seconds several times per day.    1. Myofascial pain        2. Segmental dysfunction of lumbar region        3. Sacroiliac joint dysfunction        4. Segmental dysfunction of thoracic region          Plan:  Treatment today consisted of myofascial release via Graston Technique to the bilateral lumbar erector spinae, quadratus lumborum, and left gluteus medius. GT 3 was used.  Myofascial release for ischemic compression was performed to the left gluteus medius and piriformis.    Manipulation was performed to the left sacroiliac joint via Perdomo low force drop technique. Contact was made to the left ischial tuberosity and the right lateral aspect of the apex of the sacrum. Manipulation was performed to the lumbar restrictions via manual lumbar flexion distraction technique.  Manipulation was performed to the thoracic restrictions via grade 2 mobilization techniques and low force diversified maneuvers.  No high velocity thrust was applied.  An audible release occurred and was well-tolerated.    PNF stretching was performed to the left gluteus medius and piriformis.    Subjective:  The patient states she feels better when compared to last chiropractic visit.  She stated she her back felt the best it has felt in several weeks when she bowled last night.  She stated she was aware of more discomfort in the left lower back/sacroiliac region when compared to the right.  She also reported that she was aware of some symptoms into the left distal gluteal region but denied experiencing pain in the left superior lateral gluteal region.  She has increased pain in the lumbosacral region with  sleeping, walking, standing, and bowling. She has decreased symptoms with static positions for short periods of time. The patient describes her pain level as a 1 on a 0-10 pain scale today.      Objective:  Negative Derefield on the left. Active myofascial trigger points, hypertonicity, and tenderness were present in the bilateral lumbar erector spinae, quadratus lumborum, gluteus medius, left anterior/lateral gluteus medius, and left tensor fascia brandon.  Intersegmental motion was decreased in the thoracic spine including joint dysfunctions at T9-10 and T10-11. Intersegmental motion was decreased in the lumbar spine including joint dysfunctions at L2-3 and L5-S1. Intersegmental motion was decreased in the left sacroiliac joint.      I have used the Epic copy/forward function to compose this note.  I have reviewed my current note to ensure it reflects the current patient status, exam, assessment and plan.    Dictation voice to text software has been used in the creation of this document. Please consider this in light of any contextual or grammatical errors.

## 2025-07-11 NOTE — TELEPHONE ENCOUNTER
Reason for call:   [x] Refill   [] Prior Auth  [x] Other: Romeo Finn, - retired - Dr Jaiyeola will be taking over refills awaiting office to call for appt   Mail order - 90 day supply   - 5 tablets left (7.11.2025)  Office:   [] PCP/Provider -   [x] Specialty/Provider - Diana Jaiyeola    Medication:     linaCLOtide (Linzess) 145 MCG CAPS       Dose/Frequency: Take 1 capsule (145 mcg total) by mouth daily before breakfast,     Quantity: 90    Pharmacy:   [facility] [place] -  [address]         Mail orderPharmacy   Does the patient have enough for 10 days?   [] Yes   [x] No - Send as HP to POD

## 2025-07-14 ENCOUNTER — PROCEDURE VISIT (OUTPATIENT)
Age: 40
End: 2025-07-14
Payer: COMMERCIAL

## 2025-07-14 VITALS — HEIGHT: 72 IN | BODY MASS INDEX: 30.61 KG/M2 | WEIGHT: 226 LBS

## 2025-07-14 DIAGNOSIS — M99.03 SEGMENTAL DYSFUNCTION OF LUMBAR REGION: ICD-10-CM

## 2025-07-14 DIAGNOSIS — M99.02 SEGMENTAL DYSFUNCTION OF THORACIC REGION: ICD-10-CM

## 2025-07-14 DIAGNOSIS — M53.3 SACROILIAC JOINT DYSFUNCTION: ICD-10-CM

## 2025-07-14 DIAGNOSIS — M79.18 MYOFASCIAL PAIN: Primary | ICD-10-CM

## 2025-07-14 PROCEDURE — 98941 CHIROPRACT MANJ 3-4 REGIONS: CPT | Performed by: CHIROPRACTOR

## 2025-07-14 PROCEDURE — 97110 THERAPEUTIC EXERCISES: CPT | Performed by: CHIROPRACTOR

## 2025-07-14 RX ORDER — LINACLOTIDE 145 UG/1
145 CAPSULE, GELATIN COATED ORAL
Qty: 90 CAPSULE | Refills: 1 | Status: SHIPPED | OUTPATIENT
Start: 2025-07-14

## 2025-07-14 NOTE — PROGRESS NOTES
Assessment:  This is an exacerbation of the current condition.    1. Myofascial pain        2. Segmental dysfunction of lumbar region        3. Sacroiliac joint dysfunction        4. Segmental dysfunction of thoracic region          Plan:  Treatment today consisted of myofascial release via ischemic compression to the left lumbar erector spinae.    Manipulation was performed to the left sacroiliac joint via Perdomo low force drop technique. Contact was made to the left ischial tuberosity and the right lateral aspect of the apex of the sacrum. Manipulation was performed to the lumbar restrictions via manual lumbar flexion distraction technique.  Manipulation was performed to the thoracic restrictions via grade 2 mobilization techniques and low force diversified maneuvers.  No high velocity thrust was applied.  An audible release occurred and was well-tolerated.    Therapeutic procedures were performed with the goal of improving flexibility and range of motion and therefore function. I performed PNF stretching to the bilateral hamstrings, bilateral gluteus medius and bilateral piriformis.  Additionally the patient performed active movements with passive assisted stretching to the bilateral bilateral lumbar erector spinae and iliopsoas/hip flexors. The above mentioned were performed with the goal of improving range of motion and flexibility. The therapeutic procedures/exercises were performed for at least 10 minutes.    Subjective:  The patient states she went to a concert on Saturday night in [facility].  She stated she was standing for prolonged period time while at the concert and then drove home.  She indicated that there was a lot of traffic was exiting the parking lot which required sitting in the car for a long time prior to leaving the parking lot.  She stated she did not have more pronounced pain in her back when she was standing at the concert or afterwards.  However she stated she woke up on  Sunday morning with more pronounced pain and difficulty bending due to pain in the lower back.  The patient stated she has been doing the prescribed stretching exercises and applying ice as instructed.  She stated she was aware of more discomfort in the bilateral lower back/sacroiliac region.  She reports she is experiencing mild symptoms into the left superior lateral gluteal region but denied experiencing pain in the left distal gluteal region.  She has increased pain in the lumbosacral region with sleeping, walking, standing, and bowling. She has decreased symptoms with static positions for short periods of time. The patient describes her pain level as a 2 or 3 on a 0-10 pain scale today.      Objective:  Negative Derefield on the left. Active myofascial trigger points, hypertonicity, and tenderness were present in the bilateral lumbar erector spinae, quadratus lumborum, gluteus medius, left anterior/lateral gluteus medius, and left tensor fascia brandon.  Intersegmental motion was decreased in the thoracic spine including joint dysfunctions at T10-11 and T11-12. Intersegmental motion was decreased in the lumbar spine including joint dysfunctions at L4-5 and L5-S1. Intersegmental motion was decreased in the left sacroiliac joint.  Left hamstring flexibility was more limited when compared to the right.  Bilateral gluteal flexibility was limited.  However gluteal and hamstring flexibility improved following therapeutic procedures.  Bilateral iliopsoas/hip flexor flexibility improved following therapeutic procedures.      I have used the Epic copy/forward function to compose this note.  I have reviewed my current note to ensure it reflects the current patient status, exam, assessment and plan.    Dictation voice to text software has been used in the creation of this document. Please consider this in light of any contextual or grammatical errors.

## 2025-07-18 ENCOUNTER — PROCEDURE VISIT (OUTPATIENT)
Age: 40
End: 2025-07-18
Payer: COMMERCIAL

## 2025-07-18 VITALS — WEIGHT: 226 LBS | BODY MASS INDEX: 30.61 KG/M2 | HEIGHT: 72 IN

## 2025-07-18 DIAGNOSIS — M99.02 SEGMENTAL DYSFUNCTION OF THORACIC REGION: ICD-10-CM

## 2025-07-18 DIAGNOSIS — M79.18 MYOFASCIAL PAIN: Primary | ICD-10-CM

## 2025-07-18 DIAGNOSIS — M99.03 SEGMENTAL DYSFUNCTION OF LUMBAR REGION: ICD-10-CM

## 2025-07-18 DIAGNOSIS — M53.3 SACROILIAC JOINT DYSFUNCTION: ICD-10-CM

## 2025-07-18 PROCEDURE — 98941 CHIROPRACT MANJ 3-4 REGIONS: CPT | Performed by: CHIROPRACTOR

## 2025-07-18 NOTE — PROGRESS NOTES
Assessment:  The patient is present as expected since her latest exacerbation.    1. Myofascial pain        2. Segmental dysfunction of lumbar region        3. Sacroiliac joint dysfunction        4. Segmental dysfunction of thoracic region          Plan:  Treatment today consisted of myofascial release via ischemic compression to the bilateral lumbar erector spinae.    Manipulation was performed to the left sacroiliac joint via Perdomo low force drop technique. Contact was made to the left ischial tuberosity and the right lateral aspect of the apex of the sacrum. Manipulation was performed to the lumbar restrictions via manual lumbar flexion distraction technique.  Manipulation was performed to the thoracic restrictions via grade 2 mobilization techniques and low force diversified maneuvers.  No high velocity thrust was applied.    PNF stretching was performed to the bilateral hamstrings.    Subjective:  The patient reported feeling much better when compared to the last chiropractic visit.  She stated she is not experiencing any symptoms into the left superior lateral gluteal region or left distal gluteal region.  The patient stated she has mild discomfort in the bilateral lower back/sacroiliac region.  The patient stated that she bowled last night and felt better than she typically does.  She stated she bowled well.  She typically has increased pain in the lumbosacral region with prolonged standing and bowling. She has decreased symptoms with static positions for short periods of time. The patient describes her pain level as a 1 on a 0-10 pain scale today.      Objective:  Negative Derefield on the left. Active myofascial trigger points, hypertonicity, and tenderness were present in the bilateral lumbar erector spinae, quadratus lumborum, gluteus medius, left anterior/lateral gluteus medius, and left tensor fascia brandon.  Intersegmental motion was decreased in the thoracic spine including a joint dysfunction at  T10-11.  Intersegmental motion was decreased in the lumbar spine including joint dysfunctions at L2-3 and L3-4. Intersegmental motion was decreased in the left sacroiliac joint.  Left hamstring flexibility was better when compared to last chiropractic visit and only mild inflexibility was noted in the hamstrings.      I have used the Epic copy/forward function to compose this note.  I have reviewed my current note to ensure it reflects the current patient status, exam, assessment and plan.    Dictation voice to text software has been used in the creation of this document. Please consider this in light of any contextual or grammatical errors.

## 2025-07-19 DIAGNOSIS — E66.3 OVERWEIGHT: ICD-10-CM

## 2025-07-21 RX ORDER — PHENTERMINE HYDROCHLORIDE 15 MG/1
15 CAPSULE ORAL EVERY MORNING
Qty: 30 CAPSULE | Refills: 0 | Status: SHIPPED | OUTPATIENT
Start: 2025-07-21

## 2025-07-25 ENCOUNTER — PROCEDURE VISIT (OUTPATIENT)
Age: 40
End: 2025-07-25
Payer: COMMERCIAL

## 2025-07-25 VITALS — BODY MASS INDEX: 30.61 KG/M2 | WEIGHT: 226 LBS | HEIGHT: 72 IN

## 2025-07-25 DIAGNOSIS — M79.18 MYOFASCIAL PAIN: Primary | ICD-10-CM

## 2025-07-25 DIAGNOSIS — M99.03 SEGMENTAL DYSFUNCTION OF LUMBAR REGION: ICD-10-CM

## 2025-07-25 DIAGNOSIS — M99.02 SEGMENTAL DYSFUNCTION OF THORACIC REGION: ICD-10-CM

## 2025-07-25 DIAGNOSIS — M53.3 SACROILIAC JOINT DYSFUNCTION: ICD-10-CM

## 2025-07-25 PROCEDURE — 98941 CHIROPRACT MANJ 3-4 REGIONS: CPT | Performed by: CHIROPRACTOR

## 2025-07-25 PROCEDURE — 97110 THERAPEUTIC EXERCISES: CPT | Performed by: CHIROPRACTOR

## 2025-07-25 NOTE — PROGRESS NOTES
Assessment:  The patient is progressing better recently.    1. Myofascial pain        2. Segmental dysfunction of lumbar region        3. Sacroiliac joint dysfunction        4. Segmental dysfunction of thoracic region          Plan:  Treatment today consisted of myofascial release via ischemic compression to the right lumbar erector spinae.    Manipulation was performed to the left sacroiliac joint via Perdomo low force drop technique. Contact was made to the left ischial tuberosity and the right lateral aspect of the apex of the sacrum. Manipulation was performed to the lumbar restrictions via manual lumbar flexion distraction technique.  Manipulation was performed to the thoracic restrictions via grade 2 mobilization techniques and low force diversified maneuvers.  No high velocity thrust was applied.  An audible release occurred and was well-tolerated.    Therapeutic procedures were performed with the goal of improving flexibility and range of motion and therefore function. I performed PNF stretching to the bilateral hamstrings, bilateral gluteus medius and bilateral piriformis.  Additionally the patient performed active movements with passive assisted stretching to the bilateral bilateral lumbar erector spinae and iliopsoas/hip flexors. The above mentioned were performed with the goal of improving range of motion and flexibility. The therapeutic procedures/exercises were performed for at least 10 minutes.    Subjective:  The patient reported she bowled last night and did not have significant pain.  She stated she is able to hold her bowling technique/form better because she is not having the pain in the back makes her alter her form.  She stated she bowled well.  She typically has increased pain in the lumbosacral region with prolonged standing and bowling. She has decreased symptoms with static positions for short periods of time. The patient describes her pain level as a 1 on a 0-10 pain scale today.       Objective:  Negative Derefield on the left. Active myofascial trigger points and less hypertonicity were present in the bilateral lumbar erector spinae and quadratus lumborum.  Intersegmental motion was decreased in the thoracic spine including a joint dysfunction at T10-11.  Intersegmental motion was decreased in the lumbar spine including joint dysfunctions at L2-3 and L3-4. Intersegmental motion was decreased in the left sacroiliac joint.  Bilateral gluteal and hamstring flexibility improved following therapeutic procedures.  Bilateral iliopsoas/hip flexor flexibility improved following therapeutic procedures.      I have used the Epic copy/forward function to compose this note.  I have reviewed my current note to ensure it reflects the current patient status, exam, assessment and plan.    Dictation voice to text software has been used in the creation of this document. Please consider this in light of any contextual or grammatical errors.

## 2025-07-28 ENCOUNTER — PROCEDURE VISIT (OUTPATIENT)
Age: 40
End: 2025-07-28
Payer: COMMERCIAL

## 2025-07-28 VITALS — HEIGHT: 72 IN | BODY MASS INDEX: 30.61 KG/M2 | WEIGHT: 226 LBS

## 2025-07-28 DIAGNOSIS — M99.02 SEGMENTAL DYSFUNCTION OF THORACIC REGION: ICD-10-CM

## 2025-07-28 DIAGNOSIS — M79.18 MYOFASCIAL PAIN: Primary | ICD-10-CM

## 2025-07-28 DIAGNOSIS — M53.3 SACROILIAC JOINT DYSFUNCTION: ICD-10-CM

## 2025-07-28 DIAGNOSIS — M99.03 SEGMENTAL DYSFUNCTION OF LUMBAR REGION: ICD-10-CM

## 2025-07-28 PROCEDURE — 98941 CHIROPRACT MANJ 3-4 REGIONS: CPT | Performed by: CHIROPRACTOR

## 2025-08-01 ENCOUNTER — PROCEDURE VISIT (OUTPATIENT)
Age: 40
End: 2025-08-01
Payer: COMMERCIAL

## 2025-08-01 VITALS — BODY MASS INDEX: 30.61 KG/M2 | HEIGHT: 72 IN | WEIGHT: 226 LBS

## 2025-08-01 DIAGNOSIS — M79.18 MYOFASCIAL PAIN: Primary | ICD-10-CM

## 2025-08-01 DIAGNOSIS — M99.02 SEGMENTAL DYSFUNCTION OF THORACIC REGION: ICD-10-CM

## 2025-08-01 DIAGNOSIS — M99.03 SEGMENTAL DYSFUNCTION OF LUMBAR REGION: ICD-10-CM

## 2025-08-01 DIAGNOSIS — M53.3 SACROILIAC JOINT DYSFUNCTION: ICD-10-CM

## 2025-08-01 PROCEDURE — 99213 OFFICE O/P EST LOW 20 MIN: CPT | Performed by: CHIROPRACTOR

## 2025-08-01 PROCEDURE — 98941 CHIROPRACT MANJ 3-4 REGIONS: CPT | Performed by: CHIROPRACTOR

## 2025-08-02 ENCOUNTER — APPOINTMENT (OUTPATIENT)
Dept: LAB | Facility: IMAGING CENTER | Age: 40
End: 2025-08-02
Payer: COMMERCIAL

## 2025-08-02 ENCOUNTER — APPOINTMENT (OUTPATIENT)
Dept: LAB | Facility: IMAGING CENTER | Age: 40
End: 2025-08-02

## 2025-08-02 DIAGNOSIS — Z00.8 HEALTH EXAMINATION IN POPULATION SURVEY: ICD-10-CM

## 2025-08-02 DIAGNOSIS — F39 MOOD DISORDER (HCC): ICD-10-CM

## 2025-08-02 DIAGNOSIS — D50.9 IRON DEFICIENCY ANEMIA, UNSPECIFIED IRON DEFICIENCY ANEMIA TYPE: ICD-10-CM

## 2025-08-02 DIAGNOSIS — Z00.00 ROUTINE PHYSICAL EXAMINATION: ICD-10-CM

## 2025-08-02 DIAGNOSIS — E66.3 OVERWEIGHT: ICD-10-CM

## 2025-08-02 DIAGNOSIS — Z11.4 SCREENING FOR HIV (HUMAN IMMUNODEFICIENCY VIRUS): ICD-10-CM

## 2025-08-02 LAB
ALBUMIN SERPL BCG-MCNC: 4 G/DL (ref 3.5–5)
ALP SERPL-CCNC: 35 U/L (ref 34–104)
ALT SERPL W P-5'-P-CCNC: 10 U/L (ref 7–52)
ANION GAP SERPL CALCULATED.3IONS-SCNC: 7 MMOL/L (ref 4–13)
AST SERPL W P-5'-P-CCNC: 15 U/L (ref 13–39)
BASOPHILS # BLD AUTO: 0.07 THOUSANDS/ÂΜL (ref 0–0.1)
BASOPHILS NFR BLD AUTO: 1 % (ref 0–1)
BILIRUB SERPL-MCNC: 0.5 MG/DL (ref 0.2–1)
BUN SERPL-MCNC: 15 MG/DL (ref 5–25)
CALCIUM SERPL-MCNC: 9.1 MG/DL (ref 8.4–10.2)
CHLORIDE SERPL-SCNC: 105 MMOL/L (ref 96–108)
CHOLEST SERPL-MCNC: 156 MG/DL (ref ?–200)
CO2 SERPL-SCNC: 27 MMOL/L (ref 21–32)
CREAT SERPL-MCNC: 0.88 MG/DL (ref 0.6–1.3)
EOSINOPHIL # BLD AUTO: 0.13 THOUSAND/ÂΜL (ref 0–0.61)
EOSINOPHIL NFR BLD AUTO: 2 % (ref 0–6)
ERYTHROCYTE [DISTWIDTH] IN BLOOD BY AUTOMATED COUNT: 13.6 % (ref 11.6–15.1)
EST. AVERAGE GLUCOSE BLD GHB EST-MCNC: 103 MG/DL
FERRITIN SERPL-MCNC: 35 NG/ML (ref 30–307)
GFR SERPL CREATININE-BSD FRML MDRD: 83 ML/MIN/1.73SQ M
GLUCOSE P FAST SERPL-MCNC: 81 MG/DL (ref 65–99)
HBA1C MFR BLD: 5.2 %
HCT VFR BLD AUTO: 41.5 % (ref 34.8–46.1)
HDLC SERPL-MCNC: 65 MG/DL
HGB BLD-MCNC: 13.5 G/DL (ref 11.5–15.4)
IMM GRANULOCYTES # BLD AUTO: 0.01 THOUSAND/UL (ref 0–0.2)
IMM GRANULOCYTES NFR BLD AUTO: 0 % (ref 0–2)
IRON SATN MFR SERPL: 24 % (ref 15–50)
IRON SERPL-MCNC: 86 UG/DL (ref 50–212)
LDLC SERPL CALC-MCNC: 82 MG/DL (ref 0–100)
LYMPHOCYTES # BLD AUTO: 2.26 THOUSANDS/ÂΜL (ref 0.6–4.47)
LYMPHOCYTES NFR BLD AUTO: 36 % (ref 14–44)
MCH RBC QN AUTO: 31.5 PG (ref 26.8–34.3)
MCHC RBC AUTO-ENTMCNC: 32.5 G/DL (ref 31.4–37.4)
MCV RBC AUTO: 97 FL (ref 82–98)
MONOCYTES # BLD AUTO: 0.46 THOUSAND/ÂΜL (ref 0.17–1.22)
MONOCYTES NFR BLD AUTO: 7 % (ref 4–12)
NEUTROPHILS # BLD AUTO: 3.32 THOUSANDS/ÂΜL (ref 1.85–7.62)
NEUTS SEG NFR BLD AUTO: 54 % (ref 43–75)
NONHDLC SERPL-MCNC: 91 MG/DL
NRBC BLD AUTO-RTO: 0 /100 WBCS
PLATELET # BLD AUTO: 233 THOUSANDS/UL (ref 149–390)
PMV BLD AUTO: 11.8 FL (ref 8.9–12.7)
POTASSIUM SERPL-SCNC: 5 MMOL/L (ref 3.5–5.3)
PROT SERPL-MCNC: 6.9 G/DL (ref 6.4–8.4)
RBC # BLD AUTO: 4.28 MILLION/UL (ref 3.81–5.12)
SODIUM SERPL-SCNC: 139 MMOL/L (ref 135–147)
TIBC SERPL-MCNC: 361.2 UG/DL (ref 250–450)
TRANSFERRIN SERPL-MCNC: 258 MG/DL (ref 203–362)
TRIGL SERPL-MCNC: 43 MG/DL (ref ?–150)
TSH SERPL DL<=0.05 MIU/L-ACNC: 1.66 UIU/ML (ref 0.45–4.5)
UIBC SERPL-MCNC: 275 UG/DL (ref 155–355)
WBC # BLD AUTO: 6.25 THOUSAND/UL (ref 4.31–10.16)

## 2025-08-02 PROCEDURE — 84443 ASSAY THYROID STIM HORMONE: CPT

## 2025-08-02 PROCEDURE — 83550 IRON BINDING TEST: CPT

## 2025-08-02 PROCEDURE — 87389 HIV-1 AG W/HIV-1&-2 AB AG IA: CPT

## 2025-08-02 PROCEDURE — 83036 HEMOGLOBIN GLYCOSYLATED A1C: CPT

## 2025-08-02 PROCEDURE — 85025 COMPLETE CBC W/AUTO DIFF WBC: CPT

## 2025-08-02 PROCEDURE — 83540 ASSAY OF IRON: CPT

## 2025-08-02 PROCEDURE — 82728 ASSAY OF FERRITIN: CPT

## 2025-08-02 PROCEDURE — 80061 LIPID PANEL: CPT

## 2025-08-02 PROCEDURE — 36415 COLL VENOUS BLD VENIPUNCTURE: CPT

## 2025-08-02 PROCEDURE — 80053 COMPREHEN METABOLIC PANEL: CPT

## 2025-08-03 LAB — HIV 1+2 AB+HIV1 P24 AG SERPL QL IA: NORMAL

## 2025-08-08 ENCOUNTER — PROCEDURE VISIT (OUTPATIENT)
Age: 40
End: 2025-08-08
Payer: COMMERCIAL

## 2025-08-08 VITALS — WEIGHT: 226 LBS | BODY MASS INDEX: 30.61 KG/M2 | HEIGHT: 72 IN

## 2025-08-08 DIAGNOSIS — M99.02 SEGMENTAL DYSFUNCTION OF THORACIC REGION: ICD-10-CM

## 2025-08-08 DIAGNOSIS — M79.18 MYOFASCIAL PAIN: Primary | ICD-10-CM

## 2025-08-08 DIAGNOSIS — M99.03 SEGMENTAL DYSFUNCTION OF LUMBAR REGION: ICD-10-CM

## 2025-08-08 DIAGNOSIS — M53.3 SACROILIAC JOINT DYSFUNCTION: ICD-10-CM

## 2025-08-08 PROCEDURE — 97110 THERAPEUTIC EXERCISES: CPT | Performed by: CHIROPRACTOR

## 2025-08-08 PROCEDURE — 98941 CHIROPRACT MANJ 3-4 REGIONS: CPT | Performed by: CHIROPRACTOR

## 2025-08-11 ENCOUNTER — PROCEDURE VISIT (OUTPATIENT)
Age: 40
End: 2025-08-11
Payer: COMMERCIAL

## 2025-08-15 ENCOUNTER — PROCEDURE VISIT (OUTPATIENT)
Age: 40
End: 2025-08-15
Payer: COMMERCIAL

## 2025-08-18 ENCOUNTER — PROCEDURE VISIT (OUTPATIENT)
Age: 40
End: 2025-08-18
Payer: COMMERCIAL

## 2025-08-18 VITALS — WEIGHT: 226 LBS | BODY MASS INDEX: 30.61 KG/M2 | HEIGHT: 72 IN

## 2025-08-18 DIAGNOSIS — M53.3 SACROILIAC JOINT DYSFUNCTION: ICD-10-CM

## 2025-08-18 DIAGNOSIS — M99.03 SEGMENTAL DYSFUNCTION OF LUMBAR REGION: ICD-10-CM

## 2025-08-18 DIAGNOSIS — M99.02 SEGMENTAL DYSFUNCTION OF THORACIC REGION: ICD-10-CM

## 2025-08-18 DIAGNOSIS — M79.18 MYOFASCIAL PAIN: Primary | ICD-10-CM

## 2025-08-18 PROCEDURE — 98941 CHIROPRACT MANJ 3-4 REGIONS: CPT | Performed by: CHIROPRACTOR

## 2025-08-21 ENCOUNTER — PROCEDURE VISIT (OUTPATIENT)
Age: 40
End: 2025-08-21
Payer: COMMERCIAL

## 2025-08-21 VITALS — HEIGHT: 72 IN | WEIGHT: 226 LBS | BODY MASS INDEX: 30.61 KG/M2

## 2025-08-21 DIAGNOSIS — M99.02 SEGMENTAL DYSFUNCTION OF THORACIC REGION: ICD-10-CM

## 2025-08-21 DIAGNOSIS — M99.03 SEGMENTAL DYSFUNCTION OF LUMBAR REGION: ICD-10-CM

## 2025-08-21 DIAGNOSIS — M79.18 MYOFASCIAL PAIN: Primary | ICD-10-CM

## 2025-08-21 DIAGNOSIS — M53.3 SACROILIAC JOINT DYSFUNCTION: ICD-10-CM

## 2025-08-21 PROCEDURE — 98941 CHIROPRACT MANJ 3-4 REGIONS: CPT | Performed by: CHIROPRACTOR

## 2025-08-22 ENCOUNTER — ANNUAL EXAM (OUTPATIENT)
Dept: OBGYN CLINIC | Facility: MEDICAL CENTER | Age: 40
End: 2025-08-22
Payer: COMMERCIAL

## 2025-08-22 VITALS
BODY MASS INDEX: 30.34 KG/M2 | HEIGHT: 72 IN | DIASTOLIC BLOOD PRESSURE: 72 MMHG | SYSTOLIC BLOOD PRESSURE: 112 MMHG | WEIGHT: 224 LBS

## 2025-08-22 DIAGNOSIS — Z12.31 ENCOUNTER FOR SCREENING MAMMOGRAM FOR BREAST CANCER: Primary | ICD-10-CM

## 2025-08-22 DIAGNOSIS — Z01.419 WOMEN'S ANNUAL ROUTINE GYNECOLOGICAL EXAMINATION: ICD-10-CM

## 2025-08-22 PROCEDURE — S0612 ANNUAL GYNECOLOGICAL EXAMINA: HCPCS | Performed by: OBSTETRICS & GYNECOLOGY
